# Patient Record
Sex: FEMALE | Race: WHITE | NOT HISPANIC OR LATINO | Employment: UNEMPLOYED | URBAN - METROPOLITAN AREA
[De-identification: names, ages, dates, MRNs, and addresses within clinical notes are randomized per-mention and may not be internally consistent; named-entity substitution may affect disease eponyms.]

---

## 2023-04-04 ENCOUNTER — TELEPHONE (OUTPATIENT)
Dept: PRIMARY CARE | Facility: CLINIC | Age: 53
End: 2023-04-04

## 2023-04-04 NOTE — TELEPHONE ENCOUNTER
Pt called in stating that she had to push her surgery back due to her father getting sick. Her surgery is now set for the end of May and the surgeons office is requiring updated surgical clearance labs since the original ones will not be within a month of surgery. Please place orders and I will notify the pt.

## 2023-04-27 ENCOUNTER — APPOINTMENT (OUTPATIENT)
Dept: PRIMARY CARE | Facility: CLINIC | Age: 53
End: 2023-04-27

## 2023-04-28 ENCOUNTER — LAB (OUTPATIENT)
Dept: LAB | Facility: LAB | Age: 53
End: 2023-04-28
Payer: COMMERCIAL

## 2023-04-28 ENCOUNTER — OFFICE VISIT (OUTPATIENT)
Dept: PRIMARY CARE | Facility: CLINIC | Age: 53
End: 2023-04-28
Payer: COMMERCIAL

## 2023-04-28 VITALS
HEIGHT: 65 IN | SYSTOLIC BLOOD PRESSURE: 114 MMHG | DIASTOLIC BLOOD PRESSURE: 80 MMHG | WEIGHT: 145.8 LBS | HEART RATE: 104 BPM | BODY MASS INDEX: 24.29 KG/M2

## 2023-04-28 DIAGNOSIS — B00.9 HERPES SIMPLEX TYPE 2 INFECTION: ICD-10-CM

## 2023-04-28 DIAGNOSIS — G43.829 MENSTRUAL MIGRAINE WITHOUT STATUS MIGRAINOSUS, NOT INTRACTABLE: ICD-10-CM

## 2023-04-28 DIAGNOSIS — E27.8 ADRENAL GLAND CYST (MULTI): ICD-10-CM

## 2023-04-28 DIAGNOSIS — Z00.00 HEALTHCARE MAINTENANCE: ICD-10-CM

## 2023-04-28 DIAGNOSIS — E78.2 MIXED HYPERLIPIDEMIA: ICD-10-CM

## 2023-04-28 DIAGNOSIS — Z00.00 HEALTHCARE MAINTENANCE: Primary | ICD-10-CM

## 2023-04-28 PROBLEM — M20.12 ACQUIRED HALLUX VALGUS OF LEFT FOOT: Status: ACTIVE | Noted: 2023-04-28

## 2023-04-28 PROBLEM — E78.5 HYPERLIPIDEMIA: Status: ACTIVE | Noted: 2023-04-28

## 2023-04-28 PROBLEM — R76.8 ANA POSITIVE: Status: ACTIVE | Noted: 2023-04-28

## 2023-04-28 PROBLEM — E78.00 ELEVATED LDL CHOLESTEROL LEVEL: Status: ACTIVE | Noted: 2023-04-28

## 2023-04-28 PROBLEM — K21.9 GERD (GASTROESOPHAGEAL REFLUX DISEASE): Status: ACTIVE | Noted: 2023-04-28

## 2023-04-28 LAB
ALANINE AMINOTRANSFERASE (SGPT) (U/L) IN SER/PLAS: 13 U/L (ref 7–45)
ALBUMIN (G/DL) IN SER/PLAS: 4.7 G/DL (ref 3.4–5)
ALKALINE PHOSPHATASE (U/L) IN SER/PLAS: 73 U/L (ref 33–110)
ANION GAP IN SER/PLAS: 11 MMOL/L (ref 10–20)
ASPARTATE AMINOTRANSFERASE (SGOT) (U/L) IN SER/PLAS: 20 U/L (ref 9–39)
BASOPHILS (10*3/UL) IN BLOOD BY AUTOMATED COUNT: 0.06 X10E9/L (ref 0–0.1)
BASOPHILS/100 LEUKOCYTES IN BLOOD BY AUTOMATED COUNT: 1.2 % (ref 0–2)
BILIRUBIN TOTAL (MG/DL) IN SER/PLAS: 0.6 MG/DL (ref 0–1.2)
CALCIUM (MG/DL) IN SER/PLAS: 10.2 MG/DL (ref 8.6–10.3)
CARBON DIOXIDE, TOTAL (MMOL/L) IN SER/PLAS: 30 MMOL/L (ref 21–32)
CHLORIDE (MMOL/L) IN SER/PLAS: 101 MMOL/L (ref 98–107)
CHOLESTEROL (MG/DL) IN SER/PLAS: 225 MG/DL (ref 0–199)
CHOLESTEROL IN HDL (MG/DL) IN SER/PLAS: 68.5 MG/DL
CHOLESTEROL/HDL RATIO: 3.3
CORTISOL (UG/DL) IN SERUM: 20 UG/DL (ref 2.5–20)
CREATININE (MG/DL) IN SER/PLAS: 0.89 MG/DL (ref 0.5–1.05)
EOSINOPHILS (10*3/UL) IN BLOOD BY AUTOMATED COUNT: 0.05 X10E9/L (ref 0–0.7)
EOSINOPHILS/100 LEUKOCYTES IN BLOOD BY AUTOMATED COUNT: 1 % (ref 0–6)
ERYTHROCYTE DISTRIBUTION WIDTH (RATIO) BY AUTOMATED COUNT: 12.5 % (ref 11.5–14.5)
ERYTHROCYTE MEAN CORPUSCULAR HEMOGLOBIN CONCENTRATION (G/DL) BY AUTOMATED: 32.9 G/DL (ref 32–36)
ERYTHROCYTE MEAN CORPUSCULAR VOLUME (FL) BY AUTOMATED COUNT: 94 FL (ref 80–100)
ERYTHROCYTES (10*6/UL) IN BLOOD BY AUTOMATED COUNT: 5.1 X10E12/L (ref 4–5.2)
ESTIMATED AVERAGE GLUCOSE FOR HBA1C: 105 MG/DL
GFR FEMALE: 77 ML/MIN/1.73M2
GLUCOSE (MG/DL) IN SER/PLAS: 88 MG/DL (ref 74–99)
HEMATOCRIT (%) IN BLOOD BY AUTOMATED COUNT: 47.7 % (ref 36–46)
HEMOGLOBIN (G/DL) IN BLOOD: 15.7 G/DL (ref 12–16)
HEMOGLOBIN A1C/HEMOGLOBIN TOTAL IN BLOOD: 5.3 %
IMMATURE GRANULOCYTES/100 LEUKOCYTES IN BLOOD BY AUTOMATED COUNT: 0.2 % (ref 0–0.9)
LDL: 143 MG/DL (ref 0–99)
LEUKOCYTES (10*3/UL) IN BLOOD BY AUTOMATED COUNT: 4.9 X10E9/L (ref 4.4–11.3)
LYMPHOCYTES (10*3/UL) IN BLOOD BY AUTOMATED COUNT: 1.39 X10E9/L (ref 1.2–4.8)
LYMPHOCYTES/100 LEUKOCYTES IN BLOOD BY AUTOMATED COUNT: 28.5 % (ref 13–44)
MONOCYTES (10*3/UL) IN BLOOD BY AUTOMATED COUNT: 0.34 X10E9/L (ref 0.1–1)
MONOCYTES/100 LEUKOCYTES IN BLOOD BY AUTOMATED COUNT: 7 % (ref 2–10)
NEUTROPHILS (10*3/UL) IN BLOOD BY AUTOMATED COUNT: 3.02 X10E9/L (ref 1.2–7.7)
NEUTROPHILS/100 LEUKOCYTES IN BLOOD BY AUTOMATED COUNT: 62.1 % (ref 40–80)
PLATELETS (10*3/UL) IN BLOOD AUTOMATED COUNT: 271 X10E9/L (ref 150–450)
POC APPEARANCE, URINE: CLEAR
POC BILIRUBIN, URINE: NEGATIVE
POC BLOOD, URINE: NEGATIVE
POC COLOR, URINE: ABNORMAL
POC GLUCOSE, URINE: NEGATIVE MG/DL
POC KETONES, URINE: ABNORMAL MG/DL
POC LEUKOCYTES, URINE: NEGATIVE
POC NITRITE,URINE: NEGATIVE
POC PH, URINE: 6 PH
POC PROTEIN, URINE: NEGATIVE MG/DL
POC SPECIFIC GRAVITY, URINE: 1.02
POC UROBILINOGEN, URINE: 0.2 EU/DL
POTASSIUM (MMOL/L) IN SER/PLAS: 4.4 MMOL/L (ref 3.5–5.3)
PROTEIN TOTAL: 7.3 G/DL (ref 6.4–8.2)
SODIUM (MMOL/L) IN SER/PLAS: 138 MMOL/L (ref 136–145)
THYROTROPIN (MIU/L) IN SER/PLAS BY DETECTION LIMIT <= 0.05 MIU/L: 2.31 MIU/L (ref 0.44–3.98)
TRIGLYCERIDE (MG/DL) IN SER/PLAS: 68 MG/DL (ref 0–149)
UREA NITROGEN (MG/DL) IN SER/PLAS: 15 MG/DL (ref 6–23)
VLDL: 14 MG/DL (ref 0–40)

## 2023-04-28 PROCEDURE — 82533 TOTAL CORTISOL: CPT

## 2023-04-28 PROCEDURE — 99396 PREV VISIT EST AGE 40-64: CPT | Performed by: STUDENT IN AN ORGANIZED HEALTH CARE EDUCATION/TRAINING PROGRAM

## 2023-04-28 PROCEDURE — 1036F TOBACCO NON-USER: CPT | Performed by: STUDENT IN AN ORGANIZED HEALTH CARE EDUCATION/TRAINING PROGRAM

## 2023-04-28 PROCEDURE — 80061 LIPID PANEL: CPT

## 2023-04-28 PROCEDURE — 93000 ELECTROCARDIOGRAM COMPLETE: CPT | Performed by: STUDENT IN AN ORGANIZED HEALTH CARE EDUCATION/TRAINING PROGRAM

## 2023-04-28 PROCEDURE — 99213 OFFICE O/P EST LOW 20 MIN: CPT | Performed by: STUDENT IN AN ORGANIZED HEALTH CARE EDUCATION/TRAINING PROGRAM

## 2023-04-28 PROCEDURE — 80053 COMPREHEN METABOLIC PANEL: CPT

## 2023-04-28 PROCEDURE — 85025 COMPLETE CBC W/AUTO DIFF WBC: CPT

## 2023-04-28 PROCEDURE — 83036 HEMOGLOBIN GLYCOSYLATED A1C: CPT

## 2023-04-28 PROCEDURE — 84443 ASSAY THYROID STIM HORMONE: CPT

## 2023-04-28 PROCEDURE — 81002 URINALYSIS NONAUTO W/O SCOPE: CPT | Performed by: STUDENT IN AN ORGANIZED HEALTH CARE EDUCATION/TRAINING PROGRAM

## 2023-04-28 PROCEDURE — 36415 COLL VENOUS BLD VENIPUNCTURE: CPT

## 2023-04-28 RX ORDER — VALACYCLOVIR HYDROCHLORIDE 1 G/1
1000 TABLET, FILM COATED ORAL EVERY 12 HOURS
Qty: 10 TABLET | Refills: 2 | Status: SHIPPED | OUTPATIENT
Start: 2023-04-28 | End: 2023-10-04 | Stop reason: SDUPTHER

## 2023-04-28 RX ORDER — SUMATRIPTAN 50 MG/1
TABLET, FILM COATED ORAL
COMMUNITY
Start: 2016-08-22 | End: 2023-04-28 | Stop reason: SDUPTHER

## 2023-04-28 RX ORDER — SUMATRIPTAN 50 MG/1
TABLET, FILM COATED ORAL
Qty: 9 TABLET | Refills: 3 | Status: SHIPPED | OUTPATIENT
Start: 2023-04-28 | End: 2024-02-14 | Stop reason: SDUPTHER

## 2023-04-28 RX ORDER — VALACYCLOVIR HYDROCHLORIDE 1 G/1
1000 TABLET, FILM COATED ORAL EVERY 12 HOURS
COMMUNITY
End: 2023-04-28 | Stop reason: SDUPTHER

## 2023-04-28 ASSESSMENT — PATIENT HEALTH QUESTIONNAIRE - PHQ9
SUM OF ALL RESPONSES TO PHQ9 QUESTIONS 1 AND 2: 0
2. FEELING DOWN, DEPRESSED OR HOPELESS: NOT AT ALL
1. LITTLE INTEREST OR PLEASURE IN DOING THINGS: NOT AT ALL

## 2023-04-28 NOTE — RESULT ENCOUNTER NOTE
Cholesterol still elevated but did improve to 225, HDL 68, , triglycerides 68    Complete blood cell count well within normal limits    Hemoglobin A1c well within normal limits at 5.3%    Sugar, kidneys, liver, electrolytes are all within normal limits    Thyroid within normal limits    We are still waiting for the cortisol at this time

## 2023-04-28 NOTE — PROGRESS NOTES
Subjective   Patient ID: Yanci Calhoun is a 53 y.o. female who presents for Surgical Clearance.  Today she is accompanied by alone.     HPI  1. Health Maintenance/presurgical clearance  Colonoscopy: performed 9/13/2019, 10 year clearance.  Mammogram: Scheduled for end of April, provided by Gynecologist BRIGETTE.  Pap Smear/HPV; Patient states are up-to-date and normal. Follows with [facility].  Shingles up-to-date 2022 x1 and refuses to get the second due to a reaction at this time;  Tdap 2019  COVID-19 x2  Diet: High in Fats and sugars, stress eating.   Exercise:  3 times a week, Cardio and weight lifting.  Alcohol: No alcohol  Tobacco: Denies    At this time she is scheduled on May 30, 2023 for bilateral upper blepharoplasty and bilateral secondary breast reduction with chest liposuction.   She is currently scheduled to have this performed by Dr. Jonas Taylor through [facility].    2. Adrenal mass/ Unexplained night sweats/Fatigue  Follows with Dr. Hastings for bilateral adrenal adenomas.  Willing and wishing to repeat lab work if medically appropriate/necessary    3. Elevated LDL  Labs performed prior.  Prior CT Cardiac score with result of 0.  Your LDL continues to be elevated above 150  Stated that her father moved in and he eats a diabetic diet and she has been doing better and also last 10+ pounds  Eager to see how her cholesterol is doing at this time    4.  Migraine  Continues to use sumatriptan 50 mg on as-needed basis  Feels well and stable at this time  Requesting refills      Current Outpatient Medications on File Prior to Visit   Medication Sig Dispense Refill    SUMAtriptan (Imitrex) 50 mg tablet Take by mouth.      valACYclovir (Valtrex) 1 gram tablet Take 1 tablet (1,000 mg) by mouth every 12 hours.       No current facility-administered medications on file prior to visit.        No Known Allergies    Immunization History   Administered Date(s) Administered     "Pfizer Purple Cap SARS-CoV-2 05/24/2021, 06/14/2021         Review of Systems  All pertinent positive symptoms are included in the history of present illness.  All other systems have been reviewed and are negative and noncontributory to this patient's current ailments.     Objective   /80 (BP Location: Left arm, Patient Position: Sitting, BP Cuff Size: Adult)   Pulse 104   Ht 1.645 m (5' 4.75\")   Wt 66.1 kg (145 lb 12.8 oz)   BMI 24.45 kg/m²   BSA: 1.74 meters squared      Physical Exam  CONSTITUTIONAL - well nourished, well developed, looks like stated age, in no acute distress, not ill-appearing, and not tired appearing  SKIN - normal skin color and pigmentation, normal skin turgor without rash, lesions, or nodules visualized  HEAD - no trauma, normocephalic  EYES - normal external exam  ENT - TM's intact, no injection, no signs of infection, uvula midline, normal tongue movement and throat normal, no exudate  NECK - supple without rigidity, no neck mass was observed, no thyromegaly or thyroid nodules  CHEST - clear to auscultation, no wheezing, no crackles and no rales, good effort  CARDIAC - regular rate and regular rhythm, no skipped beats, no murmur  ABDOMEN - no organomegaly, soft, nontender, nondistended, normal bowel sounds, no guarding/rebound/rigidity, negative McBurney sign and negative Maki sign  EXTREMITIES - no edema, no deformities  NEUROLOGICAL - normal gait, normal balance, normal motor, no ataxia  PSYCHIATRIC - alert, pleasant and cordial, age-appropriate  IMMUNOLOGIC - no cervical lymphadenopathy     Assessment/Plan   1. Health Maintenance/Risks   Complete physical exam and found within normal limits.  EKG: Shows normal sinus rhythm without any acute changes  We will notify of the results and make recommendations accordingly  Colon cancer screening due 2029  Continue following up with your OB/GYN for your well woman visits    2. Adrenal mass  Lab work will be ordered and we will " notify of the results and make recommendations accordingly  Please continue to follow with Dr. Hastings.    3. Elevated LDL  We discussed the risks and benefits of starting a statin secondary to your elevated LDL.   However, you CT Cardiac score was 0, and Risk calculator was low.   Your risk is low enough we don't need to start a Statin.   If you desire to be more aggressive, I can start you on a low dose statin.  We will notify of the results and make recommendations accordingly    4.  Migraine  We will refill sumatriptan to continue on an as-needed basis    5.  Herpes infection  Refill of valacyclovir to use on an as-needed basis sent to your pharmacy    Please contact the office if you have any further question/concerns

## 2023-04-30 NOTE — RESULT ENCOUNTER NOTE
Cortisol well within normal limits but did increase to the upper end of normal at 20    We will continue monitoring closely but I would recommend that she follows up with the surgeon yearly

## 2023-05-01 ENCOUNTER — TELEPHONE (OUTPATIENT)
Dept: PRIMARY CARE | Facility: CLINIC | Age: 53
End: 2023-05-01
Payer: COMMERCIAL

## 2023-05-01 NOTE — TELEPHONE ENCOUNTER
----- Message from Jerome Keller, DO sent at 4/30/2023  7:57 AM EDT -----  Cortisol well within normal limits but did increase to the upper end of normal at 20    We will continue monitoring closely but I would recommend that she follows up with the surgeon yearly

## 2023-05-01 NOTE — TELEPHONE ENCOUNTER
----- Message from Jerome Keller DO sent at 4/28/2023  3:18 PM EDT -----  Cholesterol still elevated but did improve to 225, HDL 68, , triglycerides 68    Complete blood cell count well within normal limits    Hemoglobin A1c well within normal limits at 5.3%    Sugar, kidneys, liver, electrolytes are all within normal limits    Thyroid within normal limits    We are still waiting for the cortisol at this time

## 2023-10-03 PROBLEM — R61 UNEXPLAINED NIGHT SWEATS: Status: ACTIVE | Noted: 2023-10-03

## 2023-10-03 PROBLEM — L25.9 CONTACT DERMATITIS: Status: ACTIVE | Noted: 2023-10-03

## 2023-10-03 PROBLEM — G43.909 MIGRAINE: Status: ACTIVE | Noted: 2023-10-03

## 2023-10-03 PROBLEM — E27.8 ADRENAL MASS (MULTI): Status: ACTIVE | Noted: 2023-10-03

## 2023-10-03 PROBLEM — R22.0 FACIAL SWELLING: Status: ACTIVE | Noted: 2023-10-03

## 2023-10-03 PROBLEM — R53.83 FATIGUE: Status: ACTIVE | Noted: 2023-10-03

## 2023-10-03 PROBLEM — H91.90 HEARING LOSS: Status: ACTIVE | Noted: 2023-10-03

## 2023-10-03 RX ORDER — SULFAMETHOXAZOLE AND TRIMETHOPRIM 800; 160 MG/1; MG/1
1 TABLET ORAL 2 TIMES DAILY
COMMUNITY
End: 2023-10-05 | Stop reason: ALTCHOICE

## 2023-10-03 RX ORDER — NAPROXEN 500 MG/1
500 TABLET ORAL 2 TIMES DAILY
COMMUNITY
End: 2023-10-05 | Stop reason: ALTCHOICE

## 2023-10-04 ENCOUNTER — OFFICE VISIT (OUTPATIENT)
Dept: PRIMARY CARE | Facility: CLINIC | Age: 53
End: 2023-10-04
Payer: COMMERCIAL

## 2023-10-04 VITALS
SYSTOLIC BLOOD PRESSURE: 116 MMHG | WEIGHT: 132.8 LBS | BODY MASS INDEX: 22.13 KG/M2 | DIASTOLIC BLOOD PRESSURE: 70 MMHG | HEART RATE: 99 BPM | HEIGHT: 65 IN

## 2023-10-04 DIAGNOSIS — E78.2 MIXED HYPERLIPIDEMIA: ICD-10-CM

## 2023-10-04 DIAGNOSIS — G47.9 SLEEP DISTURBANCE: ICD-10-CM

## 2023-10-04 DIAGNOSIS — R61 UNEXPLAINED NIGHT SWEATS: ICD-10-CM

## 2023-10-04 DIAGNOSIS — E27.8 ADRENAL GLAND CYST (MULTI): ICD-10-CM

## 2023-10-04 DIAGNOSIS — G43.909 MIGRAINE WITHOUT STATUS MIGRAINOSUS, NOT INTRACTABLE, UNSPECIFIED MIGRAINE TYPE: ICD-10-CM

## 2023-10-04 DIAGNOSIS — R53.83 OTHER FATIGUE: ICD-10-CM

## 2023-10-04 DIAGNOSIS — E27.8 ADRENAL MASS (MULTI): ICD-10-CM

## 2023-10-04 DIAGNOSIS — F41.9 ANXIETY: Primary | ICD-10-CM

## 2023-10-04 DIAGNOSIS — B00.9 HERPES SIMPLEX TYPE 2 INFECTION: ICD-10-CM

## 2023-10-04 PROCEDURE — 1036F TOBACCO NON-USER: CPT | Performed by: STUDENT IN AN ORGANIZED HEALTH CARE EDUCATION/TRAINING PROGRAM

## 2023-10-04 PROCEDURE — 99214 OFFICE O/P EST MOD 30 MIN: CPT | Performed by: STUDENT IN AN ORGANIZED HEALTH CARE EDUCATION/TRAINING PROGRAM

## 2023-10-04 RX ORDER — VALACYCLOVIR HYDROCHLORIDE 1 G/1
1000 TABLET, FILM COATED ORAL EVERY 12 HOURS
Qty: 10 TABLET | Refills: 2 | Status: SHIPPED | OUTPATIENT
Start: 2023-10-04 | End: 2024-02-14 | Stop reason: SDUPTHER

## 2023-10-04 RX ORDER — NITROFURANTOIN 25; 75 MG/1; MG/1
1 CAPSULE ORAL 2 TIMES DAILY
COMMUNITY
Start: 2023-02-06 | End: 2023-10-05 | Stop reason: ALTCHOICE

## 2023-10-04 RX ORDER — TRAZODONE HYDROCHLORIDE 50 MG/1
50 TABLET ORAL NIGHTLY PRN
Qty: 30 TABLET | Refills: 1 | Status: SHIPPED | OUTPATIENT
Start: 2023-10-04 | End: 2024-10-03

## 2023-10-04 RX ORDER — SULFACETAMIDE SODIUM, SULFUR 100; 50 MG/G; MG/G
EMULSION TOPICAL
COMMUNITY
Start: 2023-08-17

## 2023-10-04 RX ORDER — ESCITALOPRAM OXALATE 5 MG/1
TABLET ORAL
Qty: 30 TABLET | Refills: 1 | Status: SHIPPED | OUTPATIENT
Start: 2023-10-04 | End: 2023-11-22 | Stop reason: SDUPTHER

## 2023-10-04 RX ORDER — ALPRAZOLAM 0.25 MG/1
TABLET ORAL
COMMUNITY
Start: 2023-08-29 | End: 2023-10-05 | Stop reason: ALTCHOICE

## 2023-10-04 NOTE — PROGRESS NOTES
Subjective   Patient ID: Yanci Calhoun is a 53 y.o. female who presents for anxiety and sleep depriving.   today she is accompanied by alone.     HPI  1. Anxiety with sleep depriving  Patient is going through a divorce  Patient admits of increased anxiety and problem with sleeping in the last 4 months.   She states that she sleeps only 1 to 2 hours each night.    She falls asleep around 9 PM, sleep for 2 hours and then wake ups and cannot go back to sleep for the rest of the night.  Patient appears emotional and crying, has lost appetite and has lost 16 pounds since June 2023  She states that her level of concentration has decreased and  forgets things more often.  Denies any decreased pleasure in doing things, enjoys going out with her friends and going to the gym.  Denies any ideation of hurting herself or others.  She admits of trying Ambien which has helped but is reluctant to take that continuously  She also does not want to take any Prozac or other SSRIs due to her making feel numb.      2. Adrenal mass/ Unexplained night sweats/Fatigue  Follows with Dr. Hastings for bilateral adrenal adenomas.  She has an appointment with Dr. Hastings tomorrow     3. Elevated LDL  Labs performed prior.  Prior CT Cardiac score with result of 0.  Your LDL continues to be elevated above 150  Stated that her father moved in and he eats a diabetic diet and she has been doing better and also last 16+ pounds     4.  Migraine  Continues to use sumatriptan 50 mg on as-needed basis  Feels well and stable at this time    5.  Herpes simplex type II infection  Patient takes valacyclovir 1 g twice daily during flare ups  Requesting medication refills    Current Outpatient Medications on File Prior to Visit   Medication Sig Dispense Refill    ALPRAZolam (Xanax) 0.25 mg tablet       nitrofurantoin, macrocrystal-monohydrate, (Macrobid) 100 mg capsule Take 1 capsule (100 mg) by mouth 2 times a day.      semaglutide 0.25 mg or 0.5 mg (2 mg/3 mL)  "pen injector INJECT 10 UNITS SUBCUTANEOUSLY ONCE WEEKLY AS DIRECTED. Discard vial 56 days after opening      sulfacetamide sodium-sulfur (Avar, Plexion) 10-5 % (w/w) topical emulsion WASH AFFECTED AREAS DAILY      naproxen (EC Naprosyn) 500 mg EC tablet Take 1 tablet (500 mg) by mouth 2 times a day. Do not crush, chew, or split.      sulfamethoxazole-trimethoprim (Bactrim DS) 800-160 mg tablet Take 1 tablet by mouth 2 times a day.      SUMAtriptan (Imitrex) 50 mg tablet Take 1 tablet for migraine relief.  May repeat every 2 hours.  Max 200 mg a day 9 tablet 3    valACYclovir (Valtrex) 1 gram tablet Take 1 tablet (1,000 mg) by mouth every 12 hours. 10 tablet 2     No current facility-administered medications on file prior to visit.        No Known Allergies    Immunization History   Administered Date(s) Administered    Hep A / Hep B 03/23/2016, 04/26/2016    Pfizer Purple Cap SARS-CoV-2 05/24/2021, 06/14/2021    Td vaccine, age 7 years and older (TENIVAC) 03/23/2016    Tdap vaccine, age 7 year and older (BOOSTRIX) 01/30/2014, 03/12/2019    Typhoid, Unspecified 03/23/2016    Zoster vaccine, recombinant, adult (SHINGRIX) 08/11/2022         Review of Systems  All pertinent positive symptoms are included in the history of present illness.  All other systems have been reviewed and are negative and noncontributory to this patient's current ailments.     Objective   /70 (BP Location: Left arm, Patient Position: Sitting, BP Cuff Size: Adult)   Pulse 99   Ht 1.645 m (5' 4.75\")   Wt 60.2 kg (132 lb 12.8 oz)   BMI 22.27 kg/m²   BSA: 1.66 meters squared  No visits with results within 1 Month(s) from this visit.   Latest known visit with results is:   Lab on 04/28/2023   Component Date Value Ref Range Status    TSH 04/28/2023 2.31  0.44 - 3.98 mIU/L Final     TSH testing is performed using different testing    methodology at [facility] than at other    [facility]. Direct result comparisons should    " only be made within the same method.    Cholesterol 04/28/2023 225 (H)  0 - 199 mg/dL Final    .      AGE      DESIRABLE   BORDERLINE HIGH   HIGH     0-19 Y     0 - 169       170 - 199     >/= 200    20-24 Y     0 - 189       190 - 224     >/= 225         >24 Y     0 - 199       200 - 239     >/= 240   **All ranges are based on fasting samples. Specific   therapeutic targets will vary based on patient-specific   cardiac risk.  .   Pediatric guidelines reference:Pediatrics 2011, 128(S5).   Adult guidelines reference: NCEP ATPIII Guidelines,     GATITO 2001, 258:2486-97  .   Venipuncture immediately after or during the    administration of Metamizole may lead to falsely   low results. Testing should be performed immediately   prior to Metamizole dosing.    HDL 04/28/2023 68.5  mg/dL Final    .      AGE      VERY LOW   LOW     NORMAL    HIGH       0-19 Y       < 35   < 40     40-45     ----    20-24 Y       ----   < 40       >45     ----      >24 Y       ----   < 40     40-60      >60  .    Cholesterol/HDL Ratio 04/28/2023 3.3   Final    REF VALUES  DESIRABLE  < 3.4  HIGH RISK  > 5.0    LDL 04/28/2023 143 (H)  0 - 99 mg/dL Final    .                           NEAR      BORD      AGE      DESIRABLE  OPTIMAL    HIGH     HIGH     VERY HIGH     0-19 Y     0 - 109     ---    110-129   >/= 130     ----    20-24 Y     0 - 119     ---    120-159   >/= 160     ----      >24 Y     0 -  99   100-129  130-159   160-189     >/=190  .    VLDL 04/28/2023 14  0 - 40 mg/dL Final    Triglycerides 04/28/2023 68  0 - 149 mg/dL Final    .      AGE      DESIRABLE   BORDERLINE HIGH   HIGH     VERY HIGH   0 D-90 D    19 - 174         ----         ----        ----  91 D- 9 Y     0 -  74        75 -  99     >/= 100      ----    10-19 Y     0 -  89        90 - 129     >/= 130      ----    20-24 Y     0 - 114       115 - 149     >/= 150      ----         >24 Y     0 - 149       150 - 199    200- 499    >/= 500  .   Venipuncture immediately after or  during the    administration of Metamizole may lead to falsely   low results. Testing should be performed immediately   prior to Metamizole dosing.    Glucose 04/28/2023 88  74 - 99 mg/dL Final    Sodium 04/28/2023 138  136 - 145 mmol/L Final    Potassium 04/28/2023 4.4  3.5 - 5.3 mmol/L Final    Chloride 04/28/2023 101  98 - 107 mmol/L Final    Bicarbonate 04/28/2023 30  21 - 32 mmol/L Final    Anion Gap 04/28/2023 11  10 - 20 mmol/L Final    Urea Nitrogen 04/28/2023 15  6 - 23 mg/dL Final    Creatinine 04/28/2023 0.89  0.50 - 1.05 mg/dL Final    GFR Female 04/28/2023 77  >90 mL/min/1.73m2 Final     CALCULATIONS OF ESTIMATED GFR ARE PERFORMED   USING THE 2021 CKD-EPI STUDY REFIT EQUATION   WITHOUT THE RACE VARIABLE FOR THE IDMS-TRACEABLE   CREATININE METHODS.    https://jasn.asnjournals.org/content/early/2021/09/22/ASN.3570337427    Calcium 04/28/2023 10.2  8.6 - 10.3 mg/dL Final    Albumin 04/28/2023 4.7  3.4 - 5.0 g/dL Final    Alkaline Phosphatase 04/28/2023 73  33 - 110 U/L Final    Total Protein 04/28/2023 7.3  6.4 - 8.2 g/dL Final    AST 04/28/2023 20  9 - 39 U/L Final    Total Bilirubin 04/28/2023 0.6  0.0 - 1.2 mg/dL Final    ALT (SGPT) 04/28/2023 13  7 - 45 U/L Final     Patients treated with Sulfasalazine may generate    falsely decreased results for ALT.    WBC 04/28/2023 4.9  4.4 - 11.3 x10E9/L Final    RBC 04/28/2023 5.10  4.00 - 5.20 x10E12/L Final    Hemoglobin 04/28/2023 15.7  12.0 - 16.0 g/dL Final    Hematocrit 04/28/2023 47.7 (H)  36.0 - 46.0 % Final    MCV 04/28/2023 94  80 - 100 fL Final    MCHC 04/28/2023 32.9  32.0 - 36.0 g/dL Final    Platelets 04/28/2023 271  150 - 450 x10E9/L Final    RDW 04/28/2023 12.5  11.5 - 14.5 % Final    Neutrophils % 04/28/2023 62.1  40.0 - 80.0 % Final    Immature Granulocytes %, Automated 04/28/2023 0.2  0.0 - 0.9 % Final     Immature Granulocyte Count (IG) includes promyelocytes,    myelocytes and metamyelocytes but does not include bands.   Percent differential  counts (%) should be interpreted in the   context of the absolute cell counts (cells/L).    Lymphocytes % 04/28/2023 28.5  13.0 - 44.0 % Final    Monocytes % 04/28/2023 7.0  2.0 - 10.0 % Final    Eosinophils % 04/28/2023 1.0  0.0 - 6.0 % Final    Basophils % 04/28/2023 1.2  0.0 - 2.0 % Final    Neutrophils Absolute 04/28/2023 3.02  1.20 - 7.70 x10E9/L Final    Lymphocytes Absolute 04/28/2023 1.39  1.20 - 4.80 x10E9/L Final    Monocytes Absolute 04/28/2023 0.34  0.10 - 1.00 x10E9/L Final    Eosinophils Absolute 04/28/2023 0.05  0.00 - 0.70 x10E9/L Final    Basophils Absolute 04/28/2023 0.06  0.00 - 0.10 x10E9/L Final    Hemoglobin A1C 04/28/2023 5.3  % Final         Diagnosis of Diabetes-Adults   Non-Diabetic: < or = 5.6%   Increased risk for developing diabetes: 5.7-6.4%   Diagnostic of diabetes: > or = 6.5%  .       Monitoring of Diabetes                Age (y)     Therapeutic Goal (%)   Adults:          >18           <7.0   Pediatrics:    13-18           <7.5                   7-12           <8.0                   0- 6            7.5-8.5   American Diabetes Association. Diabetes Care 33(S1), Jan 2010.    Estimated Average Glucose 04/28/2023 105  MG/DL Final    Cortisol 04/28/2023 20.0  2.5 - 20.0 ug/dL Final       Physical Exam  CONSTITUTIONAL - well nourished, well developed, looks like stated age, in no acute distress  SKIN - normal skin color and pigmentation, normal skin turgor without rash, lesions, or nodules visualized  HEAD - no trauma, normocephalic  EYES - pupils are equal and reactive to light, extraocular muscles are intact, and normal external exam  ENT - no injection, no signs of infection, uvula midline, normal tongue movement and throat normal, no exudate, nasal passage without discharge and patent  NECK - supple without rigidity, no neck mass was observed, no thyromegaly or thyroid nodules  CHEST - clear to auscultation, no wheezing, no crackles and no rales, good effort  CARDIAC - regular rate  and regular rhythm, no skipped beats, no murmur  ABDOMEN - no organomegaly, soft, nontender, nondistended, normal bowel sounds, no guarding/rebound/rigidity  EXTREMITIES - no edema, no deformities  NEUROLOGICAL - normal gait, normal balance, normal motor, no ataxia, alert, oriented and no focal signs  PSYCHIATRIC - alert, pleasant and cordial, age-appropriate  IMMUNOLOGIC - no cervical lymphadenopathy     Assessment/Plan     1.  Anxiety with sleep disturbance  We are sorry to hear that you are going through with difficulty period, that has influenced your anxiety level and sleep pattern.  After discussing in details pros and cons of antianxiety medications we decided to start on Lexapro 5 mg daily.  Trazodone 50 mg at bedtime was also added to help with your sleeping problem  Prescription of your medication was sent to your pharmacy  We will reevaluate in 4 weeks, with possibility of increasing the dose of Lexapro  Please be aware of medication affects and side effects and call the office for any new or rising problems or concerns    2. Adrenal mass  Please continue to follow with Dr. Hastings.  No further recommendation at this time.     3. Elevated LDL  We discussed the risks and benefits of starting a statin secondary to your elevated LDL.   However, you CT Cardiac score was 0, and Risk calculator was low.   Your risk is low enough we don't need to start a Statin.   Continue to monitor and repeat your lipid panel in 6 months    4.  Migraine  Continue to take sumatriptan to continue on an as-needed basis     5.  Herpes infection  Refill of valacyclovir to use on an as-needed basis sent to your pharmacy    Thank you for letting us be a part of your care team.  Please call the office if you have further questions or concerns regarding your care

## 2023-10-05 ENCOUNTER — TELEPHONE (OUTPATIENT)
Dept: SURGERY | Facility: CLINIC | Age: 53
End: 2023-10-05
Payer: COMMERCIAL

## 2023-10-05 ENCOUNTER — OFFICE VISIT (OUTPATIENT)
Dept: SURGERY | Facility: CLINIC | Age: 53
End: 2023-10-05
Payer: COMMERCIAL

## 2023-10-05 VITALS
HEART RATE: 80 BPM | SYSTOLIC BLOOD PRESSURE: 123 MMHG | WEIGHT: 131 LBS | DIASTOLIC BLOOD PRESSURE: 75 MMHG | BODY MASS INDEX: 21.97 KG/M2

## 2023-10-05 DIAGNOSIS — E27.8 ADRENAL MASS (MULTI): Primary | ICD-10-CM

## 2023-10-05 PROCEDURE — 1036F TOBACCO NON-USER: CPT | Performed by: SURGERY

## 2023-10-05 PROCEDURE — 99214 OFFICE O/P EST MOD 30 MIN: CPT | Performed by: SURGERY

## 2023-10-05 RX ORDER — DEXAMETHASONE 1 MG/1
1 TABLET ORAL ONCE
Qty: 1 TABLET | Refills: 0 | Status: SHIPPED | OUTPATIENT
Start: 2023-10-05 | End: 2024-02-14 | Stop reason: ALTCHOICE

## 2023-10-05 ASSESSMENT — PAIN SCALES - GENERAL: PAINLEVEL: 0-NO PAIN

## 2023-10-05 NOTE — TELEPHONE ENCOUNTER
Call to patient to review email sent regarding instructions for dexamethasone suppression test. Patient states email adequately explained procedure and she has no further questions regarding that. She does have questions regarding insurance coverage. I have encouraged her to start with her own insurance company in the hopes they will be able to review her previous encounter from 2021 where the exact tests were ordered. Patient instructed to call back if I can be of further assistance.    Adequate: hears normal conversation without difficulty

## 2023-10-05 NOTE — H&P
History Of Present Illness  Yanci Figueroa is a 53 y.o. female presenting with bilateral adrenal masses for follow-up.    Mrs. Figueroa has a known history of bilateral adrenal masses.  I first saw her for this issue in 2021.  At that time on CT scan as follow-up from her colon cancer she had bilateral adrenal masses 5.1 cm and the left 2.9 cm on the right.  These were both felt to be bilateral adrenal adenomas based on characteristics.  She had a full hormonal work-up which was negative for any hormonal excess from these masses.    We had a discussion of options including surgery versus observation.  She opted for observation.    In October 2022 she had a follow-up CT scan that showed the masses were stable bilaterally with no significant growth or change.    She returns for ongoing follow-up today.     Past Medical History  She has a past medical history of Personal history of other diseases of the female genital tract.    Surgical History  She has a past surgical history that includes Tonsillectomy (01/18/2016) and Breast surgery (01/18/2016).     Social History  She reports that she has never smoked. She has never used smokeless tobacco. No history on file for alcohol use and drug use.    Family History  Family History   Problem Relation Name Age of Onset    Osteoarthritis Mother      Osteoporosis Mother      Diabetes Father      Heart attack Father      Osteoporosis Other Grandmother         Allergies  Patient has no known allergies.    Results   MRN: [id number]  Patient Name: YANCI FIGUEROA     STUDY:  CT ABDOMEN WO CONTRAST;  10/7/2022 10:55 am     INDICATION:  adrenal mass no contrast per doctor's order.     COMPARISON:  CT abdomen 10/06/2021     ACCESSION NUMBER(S):  [id number]     ORDERING CLINICIAN:  ROSENDA MANZANARES     TECHNIQUE:  CT of the abdomen was performed.  Contiguous axial images were  obtained at 3 mm slice thickness through the abdomen. Coronal and  sagittal reconstructions at 3 mm slice thickness  were performed.     FINDINGS:  LOWER CHEST:  The visualized lung base is unremarkable. The heart is normal in size  without evidence of pericardial effusion. No pleural effusion is  evident.     ABDOMEN:     LIVER:  Stable subcentimeter low-attenuation lesion right hepatic lobe, too  small to characterize likely benign.     BILE DUCTS:  The bile ducts are not dilated.     GALLBLADDER:  The gallbladder is not distended and without calcified stones.     PANCREAS:  The pancreas appears unremarkable.     SPLEEN:  Within normal limits.     ADRENAL GLANDS:  Redemonstration of bilateral adrenal adenomas, the left adrenal  adenomas measure 52 x 21 mm and 30 x 15 mm, on the right the adrenal  adenomas measure 20 x 12 mm and 31 x 22 mm, unchanged since 2021.          Physical Exam  General-well-appearing no acute distress.    Abdomen-soft nontender nondistended no palpable masses.  No CVAT.      Last Recorded Vitals  Blood pressure 123/75, pulse 80, weight 59.4 kg (131 lb).    Relevant Results    See CT report above.  This was personally reviewed with the patient today.    Cortisol-patient had an elevated cortisol level at 20 mg/dL.  Previously she has been running about 9-12.  She said that she has been under a lot of stress lately.  I told her we could do a dexamethasone suppression test to work this up further.       Assessment/Plan       Mrs. Calhoun has a known history of bilateral adrenal adenomas found incidentally on CT scan.  She underwent a full hormonal work-up for this initially in 2021 which was normal.    She has no new complaints in her abdomen and normal physical exam today.  Follow-up CT October 2022 showed nodules were stable.    Plan    1.  Per NIH guidelines she should undergo another 1 year follow-up CT scan for her adrenal masses to see if there is been any change or growth.  We will order this as a CT adrenal protocol with intravenous contrast.    2.  She had a mildly elevated cortisol level.  We will  do some additional work-up on this with a dexamethasone suppression test.    I can follow-up with her once these have been completed.             Stanton Hastings MD

## 2023-10-06 NOTE — PROGRESS NOTES
History Of Present Illness  Yanci Figueroa is a 53 y.o. female presenting with bilateral adrenal masses for follow-up.     Mrs. Figueroa has a known history of bilateral adrenal masses.  I first saw her for this issue in 2021.  At that time on CT scan as follow-up from her colon cancer she had bilateral adrenal masses 5.1 cm and the left 2.9 cm on the right.  These were both felt to be bilateral adrenal adenomas based on characteristics.  She had a full hormonal work-up which was negative for any hormonal excess from these masses.     We had a discussion of options including surgery versus observation.  She opted for observation.     In October 2022 she had a follow-up CT scan that showed the masses were stable bilaterally with no significant growth or change.     She returns for ongoing follow-up today.     Past Medical History  She has a past medical history of Personal history of other diseases of the female genital tract.     Surgical History  She has a past surgical history that includes Tonsillectomy (01/18/2016) and Breast surgery (01/18/2016).     Social History  She reports that she has never smoked. She has never used smokeless tobacco. No history on file for alcohol use and drug use.     Family History  Family History          Family History   Problem Relation Name Age of Onset    Osteoarthritis Mother        Osteoporosis Mother        Diabetes Father        Heart attack Father        Osteoporosis Other Grandmother              Allergies  Patient has no known allergies.     Results   MRN: [id number]  Patient Name: YANCI FIGUEROA     STUDY:  CT ABDOMEN WO CONTRAST;  10/7/2022 10:55 am     INDICATION:  adrenal mass no contrast per doctor's order.     COMPARISON:  CT abdomen 10/06/2021     ACCESSION NUMBER(S):  [id number]     ORDERING CLINICIAN:  ROSENDA MANZANARES     TECHNIQUE:  CT of the abdomen was performed.  Contiguous axial images were  obtained at 3 mm slice thickness through the abdomen. Coronal  and  sagittal reconstructions at 3 mm slice thickness were performed.     FINDINGS:  LOWER CHEST:  The visualized lung base is unremarkable. The heart is normal in size  without evidence of pericardial effusion. No pleural effusion is  evident.     ABDOMEN:     LIVER:  Stable subcentimeter low-attenuation lesion right hepatic lobe, too  small to characterize likely benign.     BILE DUCTS:  The bile ducts are not dilated.     GALLBLADDER:  The gallbladder is not distended and without calcified stones.     PANCREAS:  The pancreas appears unremarkable.     SPLEEN:  Within normal limits.     ADRENAL GLANDS:  Redemonstration of bilateral adrenal adenomas, the left adrenal  adenomas measure 52 x 21 mm and 30 x 15 mm, on the right the adrenal  adenomas measure 20 x 12 mm and 31 x 22 mm, unchanged since 2021.           Physical Exam  General-well-appearing no acute distress.     Abdomen-soft nontender nondistended no palpable masses.  No CVAT.        Last Recorded Vitals  Blood pressure 123/75, pulse 80, weight 59.4 kg (131 lb).     Relevant Results     See CT report above.  This was personally reviewed with the patient today.     Cortisol-patient had an elevated cortisol level at 20 mg/dL.  Previously she has been running about 9-12.  She said that she has been under a lot of stress lately.  I told her we could do a dexamethasone suppression test to work this up further.        Assessment/Plan        Mrs. Calhoun has a known history of bilateral adrenal adenomas found incidentally on CT scan.  She underwent a full hormonal work-up for this initially in 2021 which was normal.     She has no new complaints in her abdomen and normal physical exam today.  Follow-up CT October 2022 showed nodules were stable.     Plan     1.  Per NIH guidelines she should undergo another 1 year follow-up CT scan for her adrenal masses to see if there is been any change or growth.  We will order this as a CT adrenal protocol with intravenous  contrast.     2.  She had a mildly elevated cortisol level.  We will do some additional work-up on this with a dexamethasone suppression test.     I can follow-up with her once these have been completed.

## 2023-10-16 DIAGNOSIS — E27.8 ADRENAL MASS (MULTI): ICD-10-CM

## 2023-10-16 DIAGNOSIS — Z01.818 ENCOUNTER FOR PREADMISSION TESTING: ICD-10-CM

## 2023-10-18 ENCOUNTER — TELEPHONE (OUTPATIENT)
Dept: SURGERY | Facility: CLINIC | Age: 53
End: 2023-10-18
Payer: COMMERCIAL

## 2023-10-18 NOTE — TELEPHONE ENCOUNTER
Call to patient to let her know she will need to go for lab to test creatinine ahead of CT scan. Patient is aware and is scheduled to go on Friday ahead of scan. No further questions.

## 2023-10-19 DIAGNOSIS — E78.2 MIXED HYPERLIPIDEMIA: ICD-10-CM

## 2023-10-20 ENCOUNTER — LAB (OUTPATIENT)
Dept: LAB | Facility: LAB | Age: 53
End: 2023-10-20
Payer: COMMERCIAL

## 2023-10-20 DIAGNOSIS — Z01.818 ENCOUNTER FOR PREADMISSION TESTING: ICD-10-CM

## 2023-10-20 DIAGNOSIS — E27.8 ADRENAL MASS (MULTI): ICD-10-CM

## 2023-10-20 DIAGNOSIS — E78.2 MIXED HYPERLIPIDEMIA: ICD-10-CM

## 2023-10-20 LAB
ALBUMIN SERPL BCP-MCNC: 4.8 G/DL (ref 3.4–5)
ALP SERPL-CCNC: 72 U/L (ref 33–110)
ALT SERPL W P-5'-P-CCNC: 18 U/L (ref 7–45)
ANION GAP SERPL CALC-SCNC: 11 MMOL/L (ref 10–20)
AST SERPL W P-5'-P-CCNC: 20 U/L (ref 9–39)
BILIRUB SERPL-MCNC: 0.7 MG/DL (ref 0–1.2)
BUN SERPL-MCNC: 19 MG/DL (ref 6–23)
CALCIUM SERPL-MCNC: 10.3 MG/DL (ref 8.6–10.3)
CHLORIDE SERPL-SCNC: 103 MMOL/L (ref 98–107)
CHOLEST SERPL-MCNC: 261 MG/DL (ref 0–199)
CHOLESTEROL/HDL RATIO: 3.7
CO2 SERPL-SCNC: 29 MMOL/L (ref 21–32)
CORTIS AM PEAK SERPL-MSCNC: 23.1 UG/DL (ref 5–20)
CREAT SERPL-MCNC: 0.89 MG/DL (ref 0.5–1.05)
GFR SERPL CREATININE-BSD FRML MDRD: 77 ML/MIN/1.73M*2
GLUCOSE SERPL-MCNC: 92 MG/DL (ref 74–99)
HDLC SERPL-MCNC: 70.1 MG/DL
LDLC SERPL CALC-MCNC: 177 MG/DL
NON HDL CHOLESTEROL: 191 MG/DL (ref 0–149)
POTASSIUM SERPL-SCNC: 4.5 MMOL/L (ref 3.5–5.3)
PROT SERPL-MCNC: 7.2 G/DL (ref 6.4–8.2)
SODIUM SERPL-SCNC: 138 MMOL/L (ref 136–145)
TRIGL SERPL-MCNC: 69 MG/DL (ref 0–149)
VLDL: 14 MG/DL (ref 0–40)

## 2023-10-20 PROCEDURE — 80375 DRUG/SUBSTANCE NOS 1-3: CPT

## 2023-10-20 PROCEDURE — 36415 COLL VENOUS BLD VENIPUNCTURE: CPT

## 2023-10-20 PROCEDURE — 80053 COMPREHEN METABOLIC PANEL: CPT

## 2023-10-20 PROCEDURE — 82024 ASSAY OF ACTH: CPT

## 2023-10-20 PROCEDURE — 82533 TOTAL CORTISOL: CPT

## 2023-10-20 PROCEDURE — 80061 LIPID PANEL: CPT

## 2023-10-21 NOTE — RESULT ENCOUNTER NOTE
Cholesterol once again elevated at 261, HDL 70, , triglycerides 69    Sugar, kidneys, liver, electrolytes are all within normal limits

## 2023-10-22 LAB — ACTH PLAS-MCNC: 4 PG/ML (ref 7.2–63.3)

## 2023-10-23 ENCOUNTER — TELEPHONE (OUTPATIENT)
Dept: PRIMARY CARE | Facility: CLINIC | Age: 53
End: 2023-10-23
Payer: COMMERCIAL

## 2023-10-23 NOTE — TELEPHONE ENCOUNTER
----- Message from Jerome Keller DO sent at 10/21/2023  9:25 AM EDT -----  Cholesterol once again elevated at 261, HDL 70, , triglycerides 69    Sugar, kidneys, liver, electrolytes are all within normal limits    Spoke w/pt, pt aware of results

## 2023-10-24 ENCOUNTER — ANCILLARY PROCEDURE (OUTPATIENT)
Dept: RADIOLOGY | Facility: CLINIC | Age: 53
End: 2023-10-24
Payer: COMMERCIAL

## 2023-10-24 DIAGNOSIS — E27.8 ADRENAL MASS (MULTI): ICD-10-CM

## 2023-10-24 PROCEDURE — 2550000001 HC RX 255 CONTRASTS: Performed by: SURGERY

## 2023-10-24 PROCEDURE — 74177 CT ABD & PELVIS W/CONTRAST: CPT

## 2023-10-24 PROCEDURE — 74177 CT ABD & PELVIS W/CONTRAST: CPT | Performed by: STUDENT IN AN ORGANIZED HEALTH CARE EDUCATION/TRAINING PROGRAM

## 2023-10-24 RX ADMIN — IOHEXOL 69 ML: 350 INJECTION, SOLUTION INTRAVENOUS at 09:52

## 2023-10-25 LAB — DEXAMETHASONE SERPL-MCNC: <50 NG/DL

## 2023-10-30 ENCOUNTER — TELEPHONE (OUTPATIENT)
Dept: SURGERY | Facility: CLINIC | Age: 53
End: 2023-10-30
Payer: COMMERCIAL

## 2023-10-30 NOTE — TELEPHONE ENCOUNTER
Call to patient. No answer. Left voicemail message requesting callback to discuss results of dexamethasone suppression test. Asked patient to verify if dexamethasone tab was taken the night before testing as prescribed. Left callback phone #.

## 2023-11-01 ENCOUNTER — TELEPHONE (OUTPATIENT)
Dept: SURGERY | Facility: CLINIC | Age: 53
End: 2023-11-01

## 2023-11-01 ENCOUNTER — APPOINTMENT (OUTPATIENT)
Dept: PRIMARY CARE | Facility: CLINIC | Age: 53
End: 2023-11-01
Payer: COMMERCIAL

## 2023-11-01 NOTE — TELEPHONE ENCOUNTER
Call from patient. She states she did not take Dexamethasone prior to lab tests. Patient notified adrenal masses on most recent CT scan are stable. Patient asking to talk to Dr. Hastings because she is confused about discussed plan of care. Patient assured I would reviewed concerns with Dr. Hastings and call back.

## 2023-11-06 ENCOUNTER — TELEPHONE (OUTPATIENT)
Dept: SURGERY | Facility: CLINIC | Age: 53
End: 2023-11-06
Payer: COMMERCIAL

## 2023-11-06 NOTE — TELEPHONE ENCOUNTER
Call to patient. No answer. Left voicemail message requesting callback to discuss patient's previously discussed concerns for continuing plan of care.

## 2023-11-07 ENCOUNTER — TELEPHONE (OUTPATIENT)
Dept: SURGERY | Facility: CLINIC | Age: 53
End: 2023-11-07
Payer: COMMERCIAL

## 2023-11-07 DIAGNOSIS — E27.8 ADRENAL MASS (MULTI): ICD-10-CM

## 2023-11-07 NOTE — TELEPHONE ENCOUNTER
Return call to patient. Discussed plan of care. Explained that dexamethasone suppression test was ordered to rule out subclinical cushings. Patient has met NIH standards for CT scan surveillance with the completion of her test this year. Adrenal masses stable. Patient would like to move forward with the dexamethasone suppression test. I confirmed that patient still has the active dexamethasone RX at [facility] and they will fill it for her today. Orders for the labs in her EMR. Instructions for completing the test emailed to patient. No further questions at this time.

## 2023-11-22 ENCOUNTER — OFFICE VISIT (OUTPATIENT)
Dept: PRIMARY CARE | Facility: CLINIC | Age: 53
End: 2023-11-22
Payer: COMMERCIAL

## 2023-11-22 VITALS
BODY MASS INDEX: 21.2 KG/M2 | HEART RATE: 95 BPM | DIASTOLIC BLOOD PRESSURE: 70 MMHG | SYSTOLIC BLOOD PRESSURE: 102 MMHG | WEIGHT: 126.4 LBS

## 2023-11-22 DIAGNOSIS — G47.9 SLEEP DISTURBANCE: ICD-10-CM

## 2023-11-22 DIAGNOSIS — F41.9 ANXIETY: Primary | ICD-10-CM

## 2023-11-22 PROCEDURE — 99214 OFFICE O/P EST MOD 30 MIN: CPT | Performed by: STUDENT IN AN ORGANIZED HEALTH CARE EDUCATION/TRAINING PROGRAM

## 2023-11-22 PROCEDURE — 1036F TOBACCO NON-USER: CPT | Performed by: STUDENT IN AN ORGANIZED HEALTH CARE EDUCATION/TRAINING PROGRAM

## 2023-11-22 RX ORDER — ESCITALOPRAM OXALATE 10 MG/1
10 TABLET ORAL DAILY
Qty: 90 TABLET | Refills: 1 | Status: SHIPPED | OUTPATIENT
Start: 2023-11-22 | End: 2024-01-03 | Stop reason: SDUPTHER

## 2023-11-22 NOTE — PROGRESS NOTES
Subjective   Patient ID: Yanci Calhoun is a 53 y.o. female who presents for Anxiety.  Today she is accompanied by alone.     HPI  1.  Anxiety and depression with sleep disturbances  Saw patient early October, where we started her on Lexapro 5 mg daily and trazodone 50 mg at bedtime.  Today, patient is still feeling relative unstable. She does like the Lexapro is helping and feels better than when she was not on any medications. She is reluctant to increase her medications.  She is also worried about any side effects associated with the medication she is taking.  She will cry randomly throughout the day, and feels very stressed.  Her sleep has gotten somewhat better she is able to fall asleep but still wakes up randomly throughout the night even on the trazodone.  Although states that the going back to sleep after waking has become easier since starting the medication. In addition patient is going through a major move, and feels additional stress due to the holidays.  Patient continues to do psychotherapy, but does not know if that is helping.  Although she is willing to stay consistent as she wants to do what ever required to get better.  Patient has had an unintentional weight loss of 30 pounds since March, she has been trying to eat but is having a hard time adjusting.    2. Hyperlipidemia  Patient is worried that her high stress and elevated cortisol levels is affecting her cholesterol as it has increased and been the highest it has ever been.  Patient is reluctant to start any medications until after her stress decreases.  CT cardiac score done in 2021 with a total calcium score of 0  ASCVD risk of 1.2%          Current Outpatient Medications on File Prior to Visit   Medication Sig Dispense Refill    dexAMETHasone (Decadron) 1 mg tablet Take 1 tablet (1 mg) by mouth 1 time for 1 dose. Take at 11:00 pm the night before lab draw for ACTH, cortisol, and DM. Have labs drawn by 9am the following day. 1 tablet 0     escitalopram (Lexapro) 5 mg tablet Take 1/2 tab daily x 7 days then 1 tab daily after 30 tablet 1    semaglutide 0.25 mg or 0.5 mg (2 mg/3 mL) pen injector INJECT 10 UNITS SUBCUTANEOUSLY ONCE WEEKLY AS DIRECTED. Discard vial 56 days after opening      sulfacetamide sodium-sulfur (Avar, Plexion) 10-5 % (w/w) topical emulsion WASH AFFECTED AREAS DAILY      SUMAtriptan (Imitrex) 50 mg tablet Take 1 tablet for migraine relief.  May repeat every 2 hours.  Max 200 mg a day 9 tablet 3    traZODone (Desyrel) 50 mg tablet Take 1 tablet (50 mg) by mouth as needed at bedtime for sleep. 30 tablet 1    valACYclovir (Valtrex) 1 gram tablet Take 1 tablet (1,000 mg) by mouth every 12 hours. 10 tablet 2     No current facility-administered medications on file prior to visit.        No Known Allergies    Immunization History   Administered Date(s) Administered    Hep A / Hep B 03/23/2016, 04/26/2016    Pfizer Purple Cap SARS-CoV-2 05/24/2021, 06/14/2021    Td vaccine, age 7 years and older (TENIVAC) 03/23/2016    Tdap vaccine, age 7 year and older (BOOSTRIX) 01/30/2014, 03/12/2019    Typhoid, Unspecified 03/23/2016    Zoster vaccine, recombinant, adult (SHINGRIX) 08/11/2022         Review of Systems  All pertinent positive symptoms are included in the history of present illness.  All other systems have been reviewed and are negative and noncontributory to this patient's current ailments.     Objective   /70 (BP Location: Left arm, Patient Position: Sitting, BP Cuff Size: Adult)   Pulse 95   Wt 57.3 kg (126 lb 6.4 oz)   LMP  (LMP Unknown)   BMI 21.20 kg/m²   BSA: 1.62 meters squared  No visits with results within 1 Month(s) from this visit.   Latest known visit with results is:   Lab on 10/20/2023   Component Date Value Ref Range Status    Cortisol  A.M. 10/20/2023 23.1 (H)  5.0 - 20.0 ug/dL Final    Dexamethasone 10/20/2023 <50.0  ng/dL Final    INTERPRETIVE INFORMATION: Dexamethasone, Serum or Plasma by                               LC-MS/MS    Adults baseline: Less than 50 ng/dL   8:00 AM draw following 1 mg dexamethasone between 11:00 pm and   12:00 am the previous evenin - 295 ng/dL   8:00 AM draw following 8 mg dexamethasone (4 x 2 mg doses) between   11:00 pm and 12:00 am the previous evenin - 2850 ng/dL  This test was developed and its performance characteristics   determined by [facility] It has not been cleared or   approved by the US Food and Drug Administration. This test was   performed in a CLIA certified laboratory and is intended for   clinical purposes.  Performed By: [facility]  [address] [place] [id number]  : Stanford Núñez MD, PhD  CLIA Number: [id number]    Adrenocorticotropic Hormone (ACTH) 10/20/2023 4.0 (L)  7.2 - 63.3 pg/mL Final    INTERPRETIVE INFORMATION: Adrenocorticotropic Hormone    Reference interval based on samples collected between 7 a.m. and   10 a.m.  No reference intervals established for p.m. collections.    Pediatric reference values are the same as adults (Acta Paediatr   Scand 1981;70:341-345).  This assay measures intact ACTH 1-39;   some types of synthetic ACTH and ACTH fragments are not detected   by this assay.  Performed By: [facility]  [address] [place] [address]  : Stanford Núñez MD, PhD  CLIA Number: [id number]    Cholesterol 10/20/2023 261 (H)  0 - 199 mg/dL Final          Age      Desirable   Borderline High   High     0-19 Y     0 - 169       170 - 199     >/= 200    20-24 Y     0 - 189       190 - 224     >/= 225         >24 Y     0 - 199       200 - 239     >/= 240   **All ranges are based on fasting samples. Specific   therapeutic targets will vary based on patient-specific   cardiac risk.    Pediatric guidelines reference:Pediatrics 2011, 128(S5).Adult guidelines reference: NCEP ATPIII Guidelines,GATITO 2001, 258:2486-97    Venipuncture immediately after or during the  administration of Metamizole may lead to falsely low results. Testing should be performed immediately prior to Metamizole dosing.    HDL-Cholesterol 10/20/2023 70.1  mg/dL Final      Age       Very Low   Low     Normal    High    0-19 Y    < 35      < 40     40-45     ----  20-24 Y    ----     < 40      >45      ----        >24 Y      ----     < 40     40-60      >60      Cholesterol/HDL Ratio 10/20/2023 3.7   Final      Ref Values  Desirable  < 3.4  High Risk  > 5.0    LDL Calculated 10/20/2023 177 (H)  <=99 mg/dL Final                                Near   Borderline      AGE      Desirable  Optimal    High     High     Very High     0-19 Y     0 - 109     ---    110-129   >/= 130     ----    20-24 Y     0 - 119     ---    120-159   >/= 160     ----      >24 Y     0 -  99   100-129  130-159   160-189     >/=190      VLDL 10/20/2023 14  0 - 40 mg/dL Final    Triglycerides 10/20/2023 69  0 - 149 mg/dL Final       Age         Desirable   Borderline High   High     Very High   0 D-90 D    19 - 174         ----         ----        ----  91 D- 9 Y     0 -  74        75 -  99     >/= 100      ----    10-19 Y     0 -  89        90 - 129     >/= 130      ----    20-24 Y     0 - 114       115 - 149     >/= 150      ----         >24 Y     0 - 149       150 - 199    200- 499    >/= 500    Venipuncture immediately after or during the administration of Metamizole may lead to falsely low results. Testing should be performed immediately prior to Metamizole dosing.    Non HDL Cholesterol 10/20/2023 191 (H)  0 - 149 mg/dL Final          Age       Desirable   Borderline High   High     Very High     0-19 Y     0 - 119       120 - 144     >/= 145    >/= 160    20-24 Y     0 - 149       150 - 189     >/= 190      ----         >24 Y    30 mg/dL above LDL Cholesterol goal      Glucose 10/20/2023 92  74 - 99 mg/dL Final    Sodium 10/20/2023 138  136 - 145 mmol/L Final    Potassium 10/20/2023 4.5  3.5 - 5.3 mmol/L Final    Chloride  10/20/2023 103  98 - 107 mmol/L Final    Bicarbonate 10/20/2023 29  21 - 32 mmol/L Final    Anion Gap 10/20/2023 11  10 - 20 mmol/L Final    Urea Nitrogen 10/20/2023 19  6 - 23 mg/dL Final    Creatinine 10/20/2023 0.89  0.50 - 1.05 mg/dL Final    eGFR 10/20/2023 77  >60 mL/min/1.73m*2 Final    Calculations of estimated GFR are performed using the 2021 CKD-EPI Study Refit equation without the race variable for the IDMS-Traceable creatinine methods.  https://jasn.asnjournals.org/content/early/2021/09/22/ASN.7462019761    Calcium 10/20/2023 10.3  8.6 - 10.3 mg/dL Final    Albumin 10/20/2023 4.8  3.4 - 5.0 g/dL Final    Alkaline Phosphatase 10/20/2023 72  33 - 110 U/L Final    Total Protein 10/20/2023 7.2  6.4 - 8.2 g/dL Final    AST 10/20/2023 20  9 - 39 U/L Final    Bilirubin, Total 10/20/2023 0.7  0.0 - 1.2 mg/dL Final    ALT 10/20/2023 18  7 - 45 U/L Final    Patients treated with Sulfasalazine may generate falsely decreased results for ALT.       Physical Exam  CONSTITUTIONAL - well developed, looks like stated age, tearful throughout visit  SKIN - normal skin color and pigmentation, normal skin turgor without rash, lesions, or nodules visualized  HEAD - no trauma, normocephalic  EYES - pupils are equal and reactive to light, and normal external exam  ENT - no injection, no signs of infection, uvula midline, normal tongue movement and throat normal, no exudate, nasal passage without discharge and patent  NECK - supple without rigidity, no neck mass was observed,   LUNG - clear to auscultation, no wheezing, no crackles and no rales, good effort  CARDIAC - regular rate and regular rhythm, no skipped beats, no murmur  ABDOMEN - no organomegaly, soft, nontender, nondistended, normal bowel sounds  EXTREMITIES - no edema, no deformities  NEUROLOGICAL - normal gait, normal balance, normal motor, alert, oriented and no focal signs  PSYCHIATRIC - alert, pleasant and cordial, age-appropriate, tearful and  emotional      Assessment/Plan     1.  Anxiety and depression with sleep disturbances  Discussed side effects with patient at length.  Increase Lexapro to 10 mg daily and continue trazodone 50 mg at bedtime.  Please continue psychotherapy with your therapist  Will follow-up in 3 months, if any if at any time you have thoughts of harming yourself please call 911 or go to the emergency department.      2.  Hyperlipidemia  No medications at this time, will continue to monitor  CT cardiac score done in 2021 with total score of 0  It is important to eat a well-balanced diet, avoiding processed carbohydrates and sugar as well as get regular exercise

## 2024-01-03 DIAGNOSIS — F41.9 ANXIETY: ICD-10-CM

## 2024-01-03 DIAGNOSIS — G47.9 SLEEP DISTURBANCE: ICD-10-CM

## 2024-01-03 RX ORDER — ESCITALOPRAM OXALATE 5 MG/1
10 TABLET ORAL DAILY
Qty: 60 TABLET | Refills: 0 | Status: SHIPPED | OUTPATIENT
Start: 2024-01-03 | End: 2024-01-30 | Stop reason: SDUPTHER

## 2024-01-30 DIAGNOSIS — G47.9 SLEEP DISTURBANCE: ICD-10-CM

## 2024-01-30 DIAGNOSIS — F41.9 ANXIETY: ICD-10-CM

## 2024-01-30 RX ORDER — ESCITALOPRAM OXALATE 5 MG/1
10 TABLET ORAL DAILY
Qty: 180 TABLET | Refills: 0 | Status: SHIPPED | OUTPATIENT
Start: 2024-01-30 | End: 2024-02-14 | Stop reason: ALTCHOICE

## 2024-02-09 ENCOUNTER — TELEPHONE (OUTPATIENT)
Dept: PRIMARY CARE | Facility: CLINIC | Age: 54
End: 2024-02-09
Payer: COMMERCIAL

## 2024-02-09 DIAGNOSIS — Z01.818 PRE-OP EVALUATION: ICD-10-CM

## 2024-02-09 DIAGNOSIS — Z00.00 HEALTHCARE MAINTENANCE: ICD-10-CM

## 2024-02-09 DIAGNOSIS — E78.2 MIXED HYPERLIPIDEMIA: Primary | ICD-10-CM

## 2024-02-09 NOTE — TELEPHONE ENCOUNTER
Pt has a surgery scheduled for 2/22/24, she is leaving to go out of town next Wednesday 2/14/24 evening. The soonest I could get her in was that same day at 2:15PM. Pt wants to have surgical clearance labs including lipid panel placed prior to her appointment so she can have these drawn before she goes out of town. Please place surgical clearance labs.

## 2024-02-12 ENCOUNTER — LAB (OUTPATIENT)
Dept: LAB | Facility: LAB | Age: 54
End: 2024-02-12
Payer: COMMERCIAL

## 2024-02-12 DIAGNOSIS — Z00.00 HEALTHCARE MAINTENANCE: ICD-10-CM

## 2024-02-12 DIAGNOSIS — Z01.818 PRE-OP EVALUATION: ICD-10-CM

## 2024-02-12 DIAGNOSIS — E78.2 MIXED HYPERLIPIDEMIA: ICD-10-CM

## 2024-02-12 LAB
ABO GROUP (TYPE) IN BLOOD: NORMAL
ALBUMIN SERPL BCP-MCNC: 4.3 G/DL (ref 3.4–5)
ALP SERPL-CCNC: 74 U/L (ref 33–110)
ALT SERPL W P-5'-P-CCNC: 19 U/L (ref 7–45)
ANION GAP SERPL CALC-SCNC: 10 MMOL/L (ref 10–20)
ANTIBODY SCREEN: NORMAL
AST SERPL W P-5'-P-CCNC: 25 U/L (ref 9–39)
BASOPHILS # BLD AUTO: 0.05 X10*3/UL (ref 0–0.1)
BASOPHILS NFR BLD AUTO: 0.9 %
BILIRUB SERPL-MCNC: 0.4 MG/DL (ref 0–1.2)
BUN SERPL-MCNC: 20 MG/DL (ref 6–23)
CALCIUM SERPL-MCNC: 9.3 MG/DL (ref 8.6–10.3)
CHLORIDE SERPL-SCNC: 105 MMOL/L (ref 98–107)
CHOLEST SERPL-MCNC: 217 MG/DL (ref 0–199)
CHOLESTEROL/HDL RATIO: 3
CO2 SERPL-SCNC: 29 MMOL/L (ref 21–32)
CREAT SERPL-MCNC: 0.81 MG/DL (ref 0.5–1.05)
EGFRCR SERPLBLD CKD-EPI 2021: 86 ML/MIN/1.73M*2
EOSINOPHIL # BLD AUTO: 0.08 X10*3/UL (ref 0–0.7)
EOSINOPHIL NFR BLD AUTO: 1.4 %
ERYTHROCYTE [DISTWIDTH] IN BLOOD BY AUTOMATED COUNT: 13.2 % (ref 11.5–14.5)
EST. AVERAGE GLUCOSE BLD GHB EST-MCNC: 117 MG/DL
GLUCOSE SERPL-MCNC: 90 MG/DL (ref 74–99)
HBA1C MFR BLD: 5.7 %
HCT VFR BLD AUTO: 46.5 % (ref 36–46)
HDLC SERPL-MCNC: 72.3 MG/DL
HGB BLD-MCNC: 14.9 G/DL (ref 12–16)
IMM GRANULOCYTES # BLD AUTO: 0.02 X10*3/UL (ref 0–0.7)
IMM GRANULOCYTES NFR BLD AUTO: 0.3 % (ref 0–0.9)
LDLC SERPL CALC-MCNC: 132 MG/DL
LYMPHOCYTES # BLD AUTO: 1.94 X10*3/UL (ref 1.2–4.8)
LYMPHOCYTES NFR BLD AUTO: 33.9 %
MCH RBC QN AUTO: 30.8 PG (ref 26–34)
MCHC RBC AUTO-ENTMCNC: 32 G/DL (ref 32–36)
MCV RBC AUTO: 96 FL (ref 80–100)
MONOCYTES # BLD AUTO: 0.4 X10*3/UL (ref 0.1–1)
MONOCYTES NFR BLD AUTO: 7 %
NEUTROPHILS # BLD AUTO: 3.23 X10*3/UL (ref 1.2–7.7)
NEUTROPHILS NFR BLD AUTO: 56.5 %
NON HDL CHOLESTEROL: 145 MG/DL (ref 0–149)
NRBC BLD-RTO: 0 /100 WBCS (ref 0–0)
PLATELET # BLD AUTO: 262 X10*3/UL (ref 150–450)
POTASSIUM SERPL-SCNC: 4.4 MMOL/L (ref 3.5–5.3)
PROT SERPL-MCNC: 7 G/DL (ref 6.4–8.2)
RBC # BLD AUTO: 4.83 X10*6/UL (ref 4–5.2)
RH FACTOR (ANTIGEN D): NORMAL
SODIUM SERPL-SCNC: 140 MMOL/L (ref 136–145)
TRIGL SERPL-MCNC: 65 MG/DL (ref 0–149)
TSH SERPL-ACNC: 1.27 MIU/L (ref 0.44–3.98)
VLDL: 13 MG/DL (ref 0–40)
WBC # BLD AUTO: 5.7 X10*3/UL (ref 4.4–11.3)

## 2024-02-12 PROCEDURE — 80053 COMPREHEN METABOLIC PANEL: CPT

## 2024-02-12 PROCEDURE — 86901 BLOOD TYPING SEROLOGIC RH(D): CPT

## 2024-02-12 PROCEDURE — 80061 LIPID PANEL: CPT

## 2024-02-12 PROCEDURE — 86850 RBC ANTIBODY SCREEN: CPT

## 2024-02-12 PROCEDURE — 85610 PROTHROMBIN TIME: CPT

## 2024-02-12 PROCEDURE — 85730 THROMBOPLASTIN TIME PARTIAL: CPT

## 2024-02-12 PROCEDURE — 83036 HEMOGLOBIN GLYCOSYLATED A1C: CPT

## 2024-02-12 PROCEDURE — 36415 COLL VENOUS BLD VENIPUNCTURE: CPT

## 2024-02-12 PROCEDURE — 85025 COMPLETE CBC W/AUTO DIFF WBC: CPT

## 2024-02-12 PROCEDURE — 84443 ASSAY THYROID STIM HORMONE: CPT

## 2024-02-12 PROCEDURE — 86900 BLOOD TYPING SEROLOGIC ABO: CPT

## 2024-02-13 LAB
APTT PPP: 33 SECONDS (ref 27–38)
INR PPP: 0.9 (ref 0.9–1.1)
PROTHROMBIN TIME: 10.4 SECONDS (ref 9.8–12.8)

## 2024-02-13 NOTE — RESULT ENCOUNTER NOTE
Hemoglobin A1c shows prediabetes at 5.7%    Cholesterol noted much improvement down to 217, HDL 72, , triglycerides 65    Complete blood cell count shows no anemia    Bleeding time within normal limits    Sugar, kidneys, liver, electrolytes are all within normal limits alongside a normal thyroid    I understand the patient does have an appoint with me tomorrow and we will most likely provide all clearance that she needs

## 2024-02-14 ENCOUNTER — OFFICE VISIT (OUTPATIENT)
Dept: PRIMARY CARE | Facility: CLINIC | Age: 54
End: 2024-02-14
Payer: COMMERCIAL

## 2024-02-14 VITALS
SYSTOLIC BLOOD PRESSURE: 110 MMHG | WEIGHT: 130 LBS | HEIGHT: 65 IN | HEART RATE: 98 BPM | DIASTOLIC BLOOD PRESSURE: 68 MMHG | BODY MASS INDEX: 21.66 KG/M2

## 2024-02-14 DIAGNOSIS — E27.8 ADRENAL GLAND CYST (MULTI): ICD-10-CM

## 2024-02-14 DIAGNOSIS — Z00.00 HEALTHCARE MAINTENANCE: Primary | ICD-10-CM

## 2024-02-14 DIAGNOSIS — Z01.818 PRE-OP EVALUATION: ICD-10-CM

## 2024-02-14 DIAGNOSIS — G43.829 MENSTRUAL MIGRAINE WITHOUT STATUS MIGRAINOSUS, NOT INTRACTABLE: ICD-10-CM

## 2024-02-14 DIAGNOSIS — F41.9 ANXIETY: ICD-10-CM

## 2024-02-14 DIAGNOSIS — E27.8 ADRENAL MASS (MULTI): ICD-10-CM

## 2024-02-14 DIAGNOSIS — B00.9 HERPES SIMPLEX TYPE 2 INFECTION: ICD-10-CM

## 2024-02-14 DIAGNOSIS — G47.9 SLEEP DISTURBANCE: ICD-10-CM

## 2024-02-14 PROCEDURE — 93000 ELECTROCARDIOGRAM COMPLETE: CPT | Performed by: STUDENT IN AN ORGANIZED HEALTH CARE EDUCATION/TRAINING PROGRAM

## 2024-02-14 PROCEDURE — 99213 OFFICE O/P EST LOW 20 MIN: CPT | Performed by: STUDENT IN AN ORGANIZED HEALTH CARE EDUCATION/TRAINING PROGRAM

## 2024-02-14 PROCEDURE — 99396 PREV VISIT EST AGE 40-64: CPT | Performed by: STUDENT IN AN ORGANIZED HEALTH CARE EDUCATION/TRAINING PROGRAM

## 2024-02-14 PROCEDURE — 1036F TOBACCO NON-USER: CPT | Performed by: STUDENT IN AN ORGANIZED HEALTH CARE EDUCATION/TRAINING PROGRAM

## 2024-02-14 RX ORDER — SUMATRIPTAN 50 MG/1
TABLET, FILM COATED ORAL
Qty: 9 TABLET | Refills: 3 | Status: SHIPPED | OUTPATIENT
Start: 2024-02-14

## 2024-02-14 RX ORDER — VALACYCLOVIR HYDROCHLORIDE 1 G/1
1000 TABLET, FILM COATED ORAL EVERY 12 HOURS
Qty: 10 TABLET | Refills: 2 | Status: SHIPPED | OUTPATIENT
Start: 2024-02-14

## 2024-02-14 RX ORDER — ACYCLOVIR 400 MG/1
TABLET ORAL
COMMUNITY
Start: 2024-02-12

## 2024-02-14 RX ORDER — ESCITALOPRAM OXALATE 10 MG/1
10 TABLET ORAL DAILY
Qty: 90 TABLET | Refills: 1 | Status: CANCELLED | OUTPATIENT
Start: 2024-02-14

## 2024-02-14 RX ORDER — CEPHALEXIN 500 MG/1
CAPSULE ORAL
COMMUNITY
Start: 2024-02-12

## 2024-02-14 ASSESSMENT — PATIENT HEALTH QUESTIONNAIRE - PHQ9
1. LITTLE INTEREST OR PLEASURE IN DOING THINGS: NOT AT ALL
SUM OF ALL RESPONSES TO PHQ9 QUESTIONS 1 AND 2: 0
2. FEELING DOWN, DEPRESSED OR HOPELESS: NOT AT ALL

## 2024-02-14 NOTE — PROGRESS NOTES
Subjective   Patient ID: Yanci Calhoun is a 54 y.o. female who presents for Surgical Clearance.  Today she is accompanied by alone.     HPI  1. Health Maintenance/presurgical clearance  Patient will be undergoing a bilateral lower blepharoplasty, upper eyelid scar revision with CO2 and breast scar revision on 2/22/2024  Overall health noted below  Colonoscopy: performed 9/13/2019, 10 year clearance.  Mammogram: Last one on 05/16/2023, provided by Gynecologist BRIGETTE.  Pap Smear/HPV; Patient states are up-to-date and normal. Follows with [facility].  Shingles up-to-date 2022 x1 and refuses to get the second due to a reaction at this time;  Tdap 2019  COVID-19 x2  Diet: , stress eating.   Exercise:  2-3 times a week, Cardio and weight lifting.  Alcohol: No alcohol  Tobacco: Denies  Routine blood work completed prior to visit     2. Hyperlipidemia  Labs performed prior to today's visit on 02/12/24  Prior CT Cardiac score with result of 0.   (down from 177)   Cholesterol 217 (down from 261)  HDL 72, triglycerides 65   Believes high numbers were as a result of high stress, which has decreased since last visit     3. Prediabetes  Recent HbA1c 5.7%   Reports having family history of diabetes     4. Adrenal mass/ Unexplained night sweats/Fatigue  Follows with Dr. Hastings for bilateral adrenal adenomas.  Willing and wishing to repeat lab work if medically appropriate/necessary    5.  Migraine  Continues to use sumatriptan 50 mg on as-needed basis  Feels well and stable at this time  Requesting refills    6. Anxiety/Depression/Sleep Disturbances   Initially taking Lexapro 5 mg and ultimately increased to 10 mg  Continues to take trazodone 50 mg at bedtime    States she feels more stable today, is working to wean off Lexapro over time  Does not need any refills as she currently is taking 5 mg every other day      Current Outpatient Medications on File Prior to Visit   Medication Sig Dispense Refill    acyclovir  (Zovirax) 400 mg tablet TAKE 1 TABLET BY MOUTH TWICE DAILY. BEGIN 3 DAYS BEFORE SURGERY AND. CONTINUE UNTIL GONE      cephalexin (Keflex) 500 mg capsule TAKE 1 CAPSULE BY MOUTH THREE TIMES DAILY. BEGIN AFTER PROCEDURE      sulfacetamide sodium-sulfur (Avar, Plexion) 10-5 % (w/w) topical emulsion WASH AFFECTED AREAS DAILY      traZODone (Desyrel) 50 mg tablet Take 1 tablet (50 mg) by mouth as needed at bedtime for sleep. 30 tablet 1    [DISCONTINUED] dexAMETHasone (Decadron) 1 mg tablet Take 1 tablet (1 mg) by mouth 1 time for 1 dose. Take at 11:00 pm the night before lab draw for ACTH, cortisol, and DM. Have labs drawn by 9am the following day. 1 tablet 0    [DISCONTINUED] escitalopram (Lexapro) 5 mg tablet Take 2 tablets (10 mg) by mouth once daily. 180 tablet 0    [DISCONTINUED] semaglutide 0.25 mg or 0.5 mg (2 mg/3 mL) pen injector INJECT 10 UNITS SUBCUTANEOUSLY ONCE WEEKLY AS DIRECTED. Discard vial 56 days after opening      [DISCONTINUED] SUMAtriptan (Imitrex) 50 mg tablet Take 1 tablet for migraine relief.  May repeat every 2 hours.  Max 200 mg a day 9 tablet 3    [DISCONTINUED] valACYclovir (Valtrex) 1 gram tablet Take 1 tablet (1,000 mg) by mouth every 12 hours. 10 tablet 2     No current facility-administered medications on file prior to visit.        No Known Allergies    Immunization History   Administered Date(s) Administered    Hep A / Hep B 03/23/2016, 04/26/2016    Pfizer Purple Cap SARS-CoV-2 05/24/2021, 06/14/2021    Td vaccine, age 7 years and older (TENIVAC) 03/23/2016    Tdap vaccine, age 7 year and older (BOOSTRIX, ADACEL) 01/30/2014, 03/12/2019    Typhoid, Unspecified 03/23/2016    Zoster vaccine, recombinant, adult (SHINGRIX) 08/11/2022         Review of Systems  All pertinent positive symptoms are included in the history of present illness.  All other systems have been reviewed and are negative and noncontributory to this patient's current ailments.     Objective   /68 (BP Location:  "Left arm, Patient Position: Sitting, BP Cuff Size: Adult)   Pulse 98   Ht 1.651 m (5' 5\")   Wt 59 kg (130 lb)   BMI 21.63 kg/m²   BSA: 1.64 meters squared      Physical Exam  CONSTITUTIONAL - well nourished, well developed, looks like stated age, in no acute distress, not ill-appearing, and not tired appearing  SKIN - normal skin color and pigmentation, normal skin turgor without rash, lesions, or nodules visualized  HEAD - no trauma, normocephalic  EYES - normal external exam  ENT - TM's intact, no injection, no signs of infection, uvula midline, normal tongue movement and throat normal, no exudate  NECK - supple without rigidity, no neck mass was observed, no thyromegaly or thyroid nodules  CHEST - clear to auscultation, no wheezing, no crackles and no rales, good effort  CARDIAC - regular rate and regular rhythm, no skipped beats, no murmur  ABDOMEN - no organomegaly, soft, nontender, nondistended  EXTREMITIES - no edema, no deformities  NEUROLOGICAL - normal gait, normal balance, normal motor, no ataxia  PSYCHIATRIC - alert, pleasant and cordial, age-appropriate  IMMUNOLOGIC - no cervical lymphadenopathy     Assessment/Plan   1. Health Maintenance/presurgical clearance  Complete history and physical exam performed  Full surgical clearance was provided  Paperwork sent to the surgeon  EKG: Shows normal sinus rhythm without any acute changes  Colon cancer screening due 2029  Routine lab work results discussed thoroughly, all are lower risk and okay for surgery  No immunizations at today's visit   Continue following up with your OB/GYN for your well woman visits    2. Elevated LDL  CT Cardiac score was 0, and Risk calculator was low, 1.2%   (down from 177)   Cholesterol 217 (down from 261)  HDL 72, triglycerides 65   Risk is low enough we don't need to start a Statin.   If you desire to be more aggressive, I can start you on a low dose statin.     3. Prediabetes  Recent HbA1c 5.7%   Continue to make " changes towards a well-balanced diet and routine exercise   Will continue to monitor      4. Adrenal mass  Lab work will be ordered and we will notify of the results and make recommendations accordingly  Please continue to follow with Dr. Hastings.    5. Anxiety/Depression/Sleep Disturbances   Continue trazodone 50 mg at bedtime   Patient planning to gradually discontinue Lexapro at this time     6.  Migraine  We will refill sumatriptan to continue on an as-needed basis    7.  Herpes infection  Refill of valacyclovir to use on an as-needed basis sent to your pharmacy      Please contact the office if you have any further question/concerns

## 2024-02-16 ENCOUNTER — APPOINTMENT (OUTPATIENT)
Dept: PRIMARY CARE | Facility: CLINIC | Age: 54
End: 2024-02-16
Payer: COMMERCIAL

## 2024-03-22 DIAGNOSIS — F41.9 ANXIETY: ICD-10-CM

## 2024-03-22 RX ORDER — ESCITALOPRAM OXALATE 10 MG/1
10 TABLET ORAL DAILY
Qty: 90 TABLET | Refills: 1 | Status: SHIPPED | OUTPATIENT
Start: 2024-03-22

## 2024-05-06 ENCOUNTER — OFFICE VISIT (OUTPATIENT)
Dept: PRIMARY CARE | Facility: CLINIC | Age: 54
End: 2024-05-06
Payer: COMMERCIAL

## 2024-05-06 VITALS
DIASTOLIC BLOOD PRESSURE: 70 MMHG | SYSTOLIC BLOOD PRESSURE: 116 MMHG | WEIGHT: 134 LBS | HEART RATE: 104 BPM | BODY MASS INDEX: 22.3 KG/M2 | OXYGEN SATURATION: 99 %

## 2024-05-06 DIAGNOSIS — E27.8 ADRENAL GLAND CYST (MULTI): ICD-10-CM

## 2024-05-06 DIAGNOSIS — M25.561 ACUTE PAIN OF RIGHT KNEE: ICD-10-CM

## 2024-05-06 DIAGNOSIS — M19.90 ARTHRITIS: Primary | ICD-10-CM

## 2024-05-06 DIAGNOSIS — S99.922A INJURY OF LEFT FOOT, INITIAL ENCOUNTER: ICD-10-CM

## 2024-05-06 DIAGNOSIS — M79.644 PAIN OF RIGHT THUMB: ICD-10-CM

## 2024-05-06 DIAGNOSIS — M25.521 RIGHT ELBOW PAIN: ICD-10-CM

## 2024-05-06 DIAGNOSIS — M25.50 ARTHRALGIA, UNSPECIFIED JOINT: ICD-10-CM

## 2024-05-06 DIAGNOSIS — R22.42 LOCALIZED SWELLING OF LEFT FOOT: ICD-10-CM

## 2024-05-06 PROCEDURE — 99214 OFFICE O/P EST MOD 30 MIN: CPT | Performed by: STUDENT IN AN ORGANIZED HEALTH CARE EDUCATION/TRAINING PROGRAM

## 2024-05-06 PROCEDURE — 1036F TOBACCO NON-USER: CPT | Performed by: STUDENT IN AN ORGANIZED HEALTH CARE EDUCATION/TRAINING PROGRAM

## 2024-05-06 RX ORDER — DOXYCYCLINE 100 MG/1
100 CAPSULE ORAL 2 TIMES DAILY
Qty: 14 CAPSULE | Refills: 0 | Status: SHIPPED | OUTPATIENT
Start: 2024-05-06 | End: 2024-05-13

## 2024-05-06 ASSESSMENT — PATIENT HEALTH QUESTIONNAIRE - PHQ9
2. FEELING DOWN, DEPRESSED OR HOPELESS: NOT AT ALL
1. LITTLE INTEREST OR PLEASURE IN DOING THINGS: NOT AT ALL
SUM OF ALL RESPONSES TO PHQ9 QUESTIONS 1 AND 2: 0

## 2024-05-06 NOTE — PROGRESS NOTES
Subjective   Patient ID: Yanci Calhoun is a 54 y.o. female who presents for Arthritis and Wound Care.  Today she is accompanied by alone.     HPI    Arthritis   R thumb (MCP), R knee, and R elbow pain x a few months.  Hodan nodes noted on hands bilaterally  Endorses FH of arthritis  Requesting arthritis labs to be drawn.     2. L lateral foot wound  On Thursday evening patient was working and the acrylic chair she was sitting in broke. During the fall she fell and scrapped her foot on the base of the chair.   She has been using bacitracin. It is healing but she wanted to have it looked at to make sure it is not infected.   10 cm well healing scrape noted on Lateral L foot. Well healing with border of erythema surrounding. No purulence noted.   Swelling appreciated distal to lesion. She says it is not painful.     3. Hx of Adrenal gland cyst  Requesting  for cortisol lab to be drawn.     Current Outpatient Medications on File Prior to Visit   Medication Sig Dispense Refill    acyclovir (Zovirax) 400 mg tablet TAKE 1 TABLET BY MOUTH TWICE DAILY. BEGIN 3 DAYS BEFORE SURGERY AND. CONTINUE UNTIL GONE      cephalexin (Keflex) 500 mg capsule TAKE 1 CAPSULE BY MOUTH THREE TIMES DAILY. BEGIN AFTER PROCEDURE      escitalopram (Lexapro) 10 mg tablet Take 1 tablet (10 mg) by mouth once daily. 90 tablet 1    sulfacetamide sodium-sulfur (Avar, Plexion) 10-5 % (w/w) topical emulsion WASH AFFECTED AREAS DAILY      SUMAtriptan (Imitrex) 50 mg tablet Take 1 tablet for migraine relief.  May repeat every 2 hours.  Max 200 mg a day 9 tablet 3    traZODone (Desyrel) 50 mg tablet Take 1 tablet (50 mg) by mouth as needed at bedtime for sleep. 30 tablet 1    valACYclovir (Valtrex) 1 gram tablet Take 1 tablet (1,000 mg) by mouth every 12 hours. 10 tablet 2     No current facility-administered medications on file prior to visit.        No Known Allergies    Immunization History   Administered Date(s) Administered    Hep A / Hep B  03/23/2016, 04/26/2016    Pfizer Purple Cap SARS-CoV-2 05/24/2021, 06/14/2021    Td vaccine, age 7 years and older (TENIVAC) 03/23/2016    Tdap vaccine, age 7 year and older (BOOSTRIX, ADACEL) 01/30/2014, 03/12/2019    Typhoid, Unspecified 03/23/2016    Zoster vaccine, recombinant, adult (SHINGRIX) 08/11/2022       Review of Systems  All pertinent positive symptoms are included in the history of present illness.  All other systems have been reviewed and are negative and noncontributory to this patient's current ailments.     Objective   /70 (BP Location: Left arm, Patient Position: Sitting, BP Cuff Size: Adult)   Pulse 104   Wt 60.8 kg (134 lb)   SpO2 99%   BMI 22.30 kg/m²   BSA: 1.67 meters squared  Growth percentiles: Facility age limit for growth %sada is 20 years. Facility age limit for growth %sada is 20 years.   No visits with results within 1 Month(s) from this visit.   Latest known visit with results is:   Lab on 02/12/2024   Component Date Value Ref Range Status    Glucose 02/12/2024 90  74 - 99 mg/dL Final    Sodium 02/12/2024 140  136 - 145 mmol/L Final    Potassium 02/12/2024 4.4  3.5 - 5.3 mmol/L Final    Chloride 02/12/2024 105  98 - 107 mmol/L Final    Bicarbonate 02/12/2024 29  21 - 32 mmol/L Final    Anion Gap 02/12/2024 10  10 - 20 mmol/L Final    Urea Nitrogen 02/12/2024 20  6 - 23 mg/dL Final    Creatinine 02/12/2024 0.81  0.50 - 1.05 mg/dL Final    eGFR 02/12/2024 86  >60 mL/min/1.73m*2 Final    Calculations of estimated GFR are performed using the 2021 CKD-EPI Study Refit equation without the race variable for the IDMS-Traceable creatinine methods.  https://jasn.asnjournals.org/content/early/2021/09/22/ASN.6400714676    Calcium 02/12/2024 9.3  8.6 - 10.3 mg/dL Final    Albumin 02/12/2024 4.3  3.4 - 5.0 g/dL Final    Alkaline Phosphatase 02/12/2024 74  33 - 110 U/L Final    Total Protein 02/12/2024 7.0  6.4 - 8.2 g/dL Final    AST 02/12/2024 25  9 - 39 U/L Final    Bilirubin, Total  02/12/2024 0.4  0.0 - 1.2 mg/dL Final    ALT 02/12/2024 19  7 - 45 U/L Final    Patients treated with Sulfasalazine may generate falsely decreased results for ALT.    Protime 02/12/2024 10.4  9.8 - 12.8 seconds Final    INR 02/12/2024 0.9  0.9 - 1.1 Final    aPTT 02/12/2024 33  27 - 38 seconds Final    ABO TYPE 02/12/2024 O   Final    Rh TYPE 02/12/2024 POS   Final    ANTIBODY SCREEN 02/12/2024 NEG   Final    Thyroid Stimulating Hormone 02/12/2024 1.27  0.44 - 3.98 mIU/L Final    Cholesterol 02/12/2024 217 (H)  0 - 199 mg/dL Final          Age      Desirable   Borderline High   High     0-19 Y     0 - 169       170 - 199     >/= 200    20-24 Y     0 - 189       190 - 224     >/= 225         >24 Y     0 - 199       200 - 239     >/= 240   **All ranges are based on fasting samples. Specific   therapeutic targets will vary based on patient-specific   cardiac risk.    Pediatric guidelines reference:Pediatrics 2011, 128(S5).Adult guidelines reference: NCEP ATPIII Guidelines,GATITO 2001, 258:2486-97    Venipuncture immediately after or during the administration of Metamizole may lead to falsely low results. Testing should be performed immediately prior to Metamizole dosing.    HDL-Cholesterol 02/12/2024 72.3  mg/dL Final      Age       Very Low   Low     Normal    High    0-19 Y    < 35      < 40     40-45     ----  20-24 Y    ----     < 40      >45      ----        >24 Y      ----     < 40     40-60      >60      Cholesterol/HDL Ratio 02/12/2024 3.0   Final      Ref Values  Desirable  < 3.4  High Risk  > 5.0    LDL Calculated 02/12/2024 132 (H)  <=99 mg/dL Final                                Near   Borderline      AGE      Desirable  Optimal    High     High     Very High     0-19 Y     0 - 109     ---    110-129   >/= 130     ----    20-24 Y     0 - 119     ---    120-159   >/= 160     ----      >24 Y     0 -  99   100-129  130-159   160-189     >/=190      VLDL 02/12/2024 13  0 - 40 mg/dL Final    Triglycerides  02/12/2024 65  0 - 149 mg/dL Final       Age         Desirable   Borderline High   High     Very High   0 D-90 D    19 - 174         ----         ----        ----  91 D- 9 Y     0 -  74        75 -  99     >/= 100      ----    10-19 Y     0 -  89        90 - 129     >/= 130      ----    20-24 Y     0 - 114       115 - 149     >/= 150      ----         >24 Y     0 - 149       150 - 199    200- 499    >/= 500    Venipuncture immediately after or during the administration of Metamizole may lead to falsely low results. Testing should be performed immediately prior to Metamizole dosing.    Non HDL Cholesterol 02/12/2024 145  0 - 149 mg/dL Final          Age       Desirable   Borderline High   High     Very High     0-19 Y     0 - 119       120 - 144     >/= 145    >/= 160    20-24 Y     0 - 149       150 - 189     >/= 190      ----         >24 Y    30 mg/dL above LDL Cholesterol goal      WBC 02/12/2024 5.7  4.4 - 11.3 x10*3/uL Final    nRBC 02/12/2024 0.0  0.0 - 0.0 /100 WBCs Final    RBC 02/12/2024 4.83  4.00 - 5.20 x10*6/uL Final    Hemoglobin 02/12/2024 14.9  12.0 - 16.0 g/dL Final    Hematocrit 02/12/2024 46.5 (H)  36.0 - 46.0 % Final    MCV 02/12/2024 96  80 - 100 fL Final    MCH 02/12/2024 30.8  26.0 - 34.0 pg Final    MCHC 02/12/2024 32.0  32.0 - 36.0 g/dL Final    RDW 02/12/2024 13.2  11.5 - 14.5 % Final    Platelets 02/12/2024 262  150 - 450 x10*3/uL Final    Neutrophils % 02/12/2024 56.5  40.0 - 80.0 % Final    Immature Granulocytes %, Automated 02/12/2024 0.3  0.0 - 0.9 % Final    Immature Granulocyte Count (IG) includes promyelocytes, myelocytes and metamyelocytes but does not include bands. Percent differential counts (%) should be interpreted in the context of the absolute cell counts (cells/UL).    Lymphocytes % 02/12/2024 33.9  13.0 - 44.0 % Final    Monocytes % 02/12/2024 7.0  2.0 - 10.0 % Final    Eosinophils % 02/12/2024 1.4  0.0 - 6.0 % Final    Basophils % 02/12/2024 0.9  0.0 - 2.0 % Final     Neutrophils Absolute 02/12/2024 3.23  1.20 - 7.70 x10*3/uL Final    Percent differential counts (%) should be interpreted in the context of the absolute cell counts (cells/uL).    Immature Granulocytes Absolute, Au* 02/12/2024 0.02  0.00 - 0.70 x10*3/uL Final    Lymphocytes Absolute 02/12/2024 1.94  1.20 - 4.80 x10*3/uL Final    Monocytes Absolute 02/12/2024 0.40  0.10 - 1.00 x10*3/uL Final    Eosinophils Absolute 02/12/2024 0.08  0.00 - 0.70 x10*3/uL Final    Basophils Absolute 02/12/2024 0.05  0.00 - 0.10 x10*3/uL Final    Hemoglobin A1C 02/12/2024 5.7 (H)  see below % Final    Estimated Average Glucose 02/12/2024 117  Not Established mg/dL Final       Physical Exam    CONSTITUTIONAL - well nourished, well developed, looks like stated age, in no acute distress, not ill-appearing, and not tired appearing  SKIN - normal skin color and pigmentation, normal skin turgor without rash, lesions, or nodules visualized  HEAD - no trauma, normocephalic  EYES - normal external exam  CHEST -no distressed breathing, good effort  EXTREMITIES - 10cm scrape on L lateral foot, well healing, edema noted distal to lesion. Non-tender. No heat. Erythematous around the border of lesion.   Hodan nodes noted on hand bilaterally  NEUROLOGICAL - normal balance, normal motor, no ataxia  PSYCHIATRIC - alert, pleasant and cordial, age-appropriate      Assessment/Plan       Arthritis  We ordered an arthritis panel to dig into the etiology of your joint pain.   We have also order Xray of the hands bilaterally.   We will follow up with results as they become available.     2. L foot wound  We ordered an X ray of affected foot.   We also sent a 7 day course of doxy to your pharmacy.   We encourage you to hold off on the bacitracin and use vaseline moving forward.     3. Hx of Adrenal gland cyst  Cortisol level was added to your blood work.     We will follow up with results as the become available.     Thank you for letting us be a part of your  care team.  Please call the office if you have further questions or concerns regarding your care

## 2024-05-07 ENCOUNTER — HOSPITAL ENCOUNTER (OUTPATIENT)
Dept: RADIOLOGY | Facility: CLINIC | Age: 54
Discharge: HOME | End: 2024-05-07
Payer: COMMERCIAL

## 2024-05-07 ENCOUNTER — LAB (OUTPATIENT)
Dept: LAB | Facility: LAB | Age: 54
End: 2024-05-07
Payer: COMMERCIAL

## 2024-05-07 DIAGNOSIS — M25.521 RIGHT ELBOW PAIN: ICD-10-CM

## 2024-05-07 DIAGNOSIS — M25.561 ACUTE PAIN OF RIGHT KNEE: ICD-10-CM

## 2024-05-07 DIAGNOSIS — M19.90 ARTHRITIS: ICD-10-CM

## 2024-05-07 DIAGNOSIS — E27.8 ADRENAL MASS (MULTI): ICD-10-CM

## 2024-05-07 DIAGNOSIS — E27.8 ADRENAL GLAND CYST (MULTI): ICD-10-CM

## 2024-05-07 DIAGNOSIS — R22.42 LOCALIZED SWELLING OF LEFT FOOT: ICD-10-CM

## 2024-05-07 DIAGNOSIS — M79.644 PAIN OF RIGHT THUMB: ICD-10-CM

## 2024-05-07 DIAGNOSIS — S99.922A INJURY OF LEFT FOOT, INITIAL ENCOUNTER: ICD-10-CM

## 2024-05-07 DIAGNOSIS — M25.50 ARTHRALGIA, UNSPECIFIED JOINT: ICD-10-CM

## 2024-05-07 LAB
CENTROMERE B AB SER-ACNC: <0.2 AI
CHROMATIN AB SERPL-ACNC: <0.2 AI
CORTIS SERPL-MCNC: 16.9 UG/DL (ref 2.5–20)
DSDNA AB SER-ACNC: <1 IU/ML
ENA JO1 AB SER QL IA: <0.2 AI
ENA RNP AB SER IA-ACNC: <0.2 AI
ENA SCL70 AB SER QL IA: <0.2 AI
ENA SM AB SER IA-ACNC: <0.2 AI
ENA SM+RNP AB SER QL IA: <0.2 AI
ENA SS-A AB SER IA-ACNC: <0.2 AI
ENA SS-B AB SER IA-ACNC: <0.2 AI
ERYTHROCYTE [SEDIMENTATION RATE] IN BLOOD BY WESTERGREN METHOD: 18 MM/H (ref 0–30)
RHEUMATOID FACT SER NEPH-ACNC: 16 IU/ML (ref 0–15)
RIBOSOMAL P AB SER-ACNC: <0.2 AI
URATE SERPL-MCNC: 4.3 MG/DL (ref 2.3–6.7)

## 2024-05-07 PROCEDURE — 86225 DNA ANTIBODY NATIVE: CPT

## 2024-05-07 PROCEDURE — 85652 RBC SED RATE AUTOMATED: CPT

## 2024-05-07 PROCEDURE — 80375 DRUG/SUBSTANCE NOS 1-3: CPT

## 2024-05-07 PROCEDURE — 82533 TOTAL CORTISOL: CPT

## 2024-05-07 PROCEDURE — 86431 RHEUMATOID FACTOR QUANT: CPT

## 2024-05-07 PROCEDURE — 82024 ASSAY OF ACTH: CPT

## 2024-05-07 PROCEDURE — 73630 X-RAY EXAM OF FOOT: CPT | Mod: LEFT SIDE | Performed by: STUDENT IN AN ORGANIZED HEALTH CARE EDUCATION/TRAINING PROGRAM

## 2024-05-07 PROCEDURE — 73130 X-RAY EXAM OF HAND: CPT | Mod: BILATERAL PROCEDURE | Performed by: STUDENT IN AN ORGANIZED HEALTH CARE EDUCATION/TRAINING PROGRAM

## 2024-05-07 PROCEDURE — 84550 ASSAY OF BLOOD/URIC ACID: CPT

## 2024-05-07 PROCEDURE — 86038 ANTINUCLEAR ANTIBODIES: CPT

## 2024-05-07 PROCEDURE — 86235 NUCLEAR ANTIGEN ANTIBODY: CPT

## 2024-05-07 PROCEDURE — 73130 X-RAY EXAM OF HAND: CPT | Mod: 50

## 2024-05-07 PROCEDURE — 36415 COLL VENOUS BLD VENIPUNCTURE: CPT

## 2024-05-07 PROCEDURE — 73630 X-RAY EXAM OF FOOT: CPT | Mod: LT

## 2024-05-07 RX ORDER — SULFAMETHOXAZOLE AND TRIMETHOPRIM 800; 160 MG/1; MG/1
1 TABLET ORAL 2 TIMES DAILY
Qty: 14 TABLET | Refills: 0 | Status: SHIPPED | OUTPATIENT
Start: 2024-05-07 | End: 2024-05-14

## 2024-05-08 DIAGNOSIS — M05.80 POLYARTHRITIS WITH POSITIVE RHEUMATOID FACTOR (MULTI): Primary | ICD-10-CM

## 2024-05-08 NOTE — RESULT ENCOUNTER NOTE
Rheumatoid factor slightly elevated at 16 with normal being 15 and below    This could be a nonspecific finding but it may be wise to discuss this further with a rheumatologist  I will place a requisition within the chart    Cortisol within normal limits    ANABEL panel completely normal alongside a normal uric acid and a normal sedimentation rate    I feel that the rheumatoid factor may be a slight false positive but if the patient would like, I will easily place the referral for rheumatology to discuss this further

## 2024-05-09 LAB — ACTH PLAS-MCNC: 1.9 PG/ML (ref 7.2–63.3)

## 2024-05-10 LAB
ANA SER QL HEP2 SUBST: NEGATIVE
ANA SER QL HEP2 SUBST: NEGATIVE

## 2024-05-11 LAB — DEXAMETHASONE SERPL-MCNC: <50 NG/DL

## 2024-05-12 NOTE — RESULT ENCOUNTER NOTE
X-ray of bilateral hands are within normal limits and no pronounced degenerative changes    These are unremarkable/normal

## 2024-05-29 ENCOUNTER — TELEPHONE (OUTPATIENT)
Dept: SURGERY | Facility: CLINIC | Age: 54
End: 2024-05-29
Payer: COMMERCIAL

## 2024-05-29 DIAGNOSIS — E27.8 ADRENAL GLAND CYST (MULTI): ICD-10-CM

## 2024-05-29 RX ORDER — DEXAMETHASONE 1 MG/1
1 TABLET ORAL ONCE
Qty: 1 TABLET | Refills: 0 | Status: SHIPPED | OUTPATIENT
Start: 2024-05-29 | End: 2024-05-29

## 2024-05-29 NOTE — TELEPHONE ENCOUNTER
Return call to patient. No answer. Left voicemail message notifying patient that her questions discussed last week were reviewed with Dr. Hastings. He does still advise her to complete dexamethasone suppression test. Instructions for test emailed to address on file. Callback requested and number provided.

## 2024-06-03 ENCOUNTER — TELEPHONE (OUTPATIENT)
Dept: SURGICAL ONCOLOGY | Facility: HOSPITAL | Age: 54
End: 2024-06-03
Payer: COMMERCIAL

## 2024-06-28 ENCOUNTER — APPOINTMENT (OUTPATIENT)
Dept: RHEUMATOLOGY | Facility: CLINIC | Age: 54
End: 2024-06-28
Payer: COMMERCIAL

## 2024-06-28 VITALS — BODY MASS INDEX: 22.3 KG/M2 | WEIGHT: 134 LBS | DIASTOLIC BLOOD PRESSURE: 73 MMHG | SYSTOLIC BLOOD PRESSURE: 118 MMHG

## 2024-06-28 DIAGNOSIS — M05.80 POLYARTHRITIS WITH POSITIVE RHEUMATOID FACTOR (MULTI): Primary | ICD-10-CM

## 2024-06-28 PROCEDURE — 99204 OFFICE O/P NEW MOD 45 MIN: CPT | Performed by: INTERNAL MEDICINE

## 2024-06-28 PROCEDURE — 1036F TOBACCO NON-USER: CPT | Performed by: INTERNAL MEDICINE

## 2024-06-28 NOTE — PROGRESS NOTES
Subjective  . Yanci Calhoun is a 54 y.o. female who presents for New Patient Visit (Pain in joints).    HPI.  54-year-old female with history of anxiety, GERD, dyslipidemia and migraines referred by primary care physician Dr. Jerome Keller DO in consultation for positive rheumatoid factor and joint pain evaluation.    Patient reports intermittent pain and swelling of the right CMC joint for the past 1 year.  She rates pain up to 5/10 at times.  She has no history of trauma to the thumb.  She has not noticed numbness, tingling or discoloration of the thumb.  She also reports right knee soreness that comes and goes for the past several months.  She has not noticed redness, swelling or warmth of the right knee.  She has no history of trauma to the right knee.    Mother has PMR.    Social history: She does not smoke.  She drinks 1 alcoholic beverages over the weekend.  Currently she is not working.  Surgical history: Breast reduction surgery and tonsillectomy.    Review of Systems   All other systems reviewed and are negative.    Objective     Blood pressure 118/73, weight 60.8 kg (134 lb).    Physical Exam.  Gen. AAO x3, NAD.  HEENT: No pallor or icterus, PERRLA, EOMI. Oropharynx is clear. MM moist,Parotid glands  not enlarged. No cervical lymphadenopathy .  Skin: No rashes.  Heart: S1, S2/ RRR. No murmurs or gallops.  Lungs: CTA B.  Abdomen: Soft, NT/ND, BS regular.  MSK: No.swelling or tenderness of the  upper or lower extremity joints with full ROM, Neck,spine and Moises SI with out tenderness.  Neuro: CN II-XII intact. Sensation to touch intact.Strength 5/5 throughout. DTR 2+ and symmetrical.  Psych:Appropriate mood and behavior  EXT: No edema    Assessment/Plan . 54-year-old female with history of anxiety, GERD, dyslipidemia and migraines presented with intermittent pain of the right CMC and knee joint.  She notes intermittent swelling of the right CMC joint.  Physical examination is benign.  Blood work  obtained last month showed positive RF at 16.  ANABEL with reflex MARNIE negative.  Uric acid and sed rate within normal limits.  Next of the hand was unremarkable.    Discussed with patient that she he has a weak positive rheumatoid factor that could be of no clinical significance.  However will obtain anti-CCP and completion of the workup.    We will communicate with the patient.     This note was partially generated using the Dragon Voice recognition system. There may be some incorrect wording, spelling and/or spelling errors or punctuation errors that were not corrected prior to committing the note to the medical record.      Problem List Items Addressed This Visit       Polyarthritis with positive rheumatoid factor (Multi) - Primary    Relevant Orders    Citrulline Antibody, IgG    C-Reactive Protein            Active Ambulatory Problems     Diagnosis Date Noted    Acquired hallux valgus of left foot 04/28/2023    Adrenal gland cyst (Multi) 04/28/2023    ANABEL positive 04/28/2023    Elevated LDL cholesterol level 04/28/2023    GERD (gastroesophageal reflux disease) 04/28/2023    Herpes simplex type 2 infection 04/28/2023    Hyperlipidemia 04/28/2023    Menstrual migraine without status migrainosus, not intractable 04/28/2023    Adrenal mass (Multi) 10/03/2023    Contact dermatitis 10/03/2023    Facial swelling 10/03/2023    Fatigue 10/03/2023    Hearing loss 10/03/2023    Migraine 10/03/2023    Unexplained night sweats 10/03/2023    Anxiety 10/04/2023    Sleep disturbance 10/04/2023    Polyarthritis with positive rheumatoid factor (Multi) 05/08/2024     Resolved Ambulatory Problems     Diagnosis Date Noted    No Resolved Ambulatory Problems     Past Medical History:   Diagnosis Date    Adrenal adenoma     Personal history of other diseases of the female genital tract        Family History   Problem Relation Name Age of Onset    Osteoarthritis Mother      Osteoporosis Mother      Diabetes Father      Heart attack Father       Osteoporosis Other Grandmother        Past Surgical History:   Procedure Laterality Date    BREAST SURGERY  01/18/2016    Breast Surgery Reduction Procedure    TONSILLECTOMY  01/18/2016    Tonsillectomy       Social History     Tobacco Use   Smoking Status Never   Smokeless Tobacco Never       Allergies  Patient has no known allergies.    Current Meds  Current Outpatient Medications   Medication Instructions    acyclovir (Zovirax) 400 mg tablet TAKE 1 TABLET BY MOUTH TWICE DAILY. BEGIN 3 DAYS BEFORE SURGERY AND. CONTINUE UNTIL GONE    cephalexin (Keflex) 500 mg capsule TAKE 1 CAPSULE BY MOUTH THREE TIMES DAILY. BEGIN AFTER PROCEDURE    dexAMETHasone (DECADRON) 1 mg, oral, Once, For dexamethasone tolerance test. Take at 11:00pm the night before lab draw. Be sure to have cortisol, DM, and ACTH labs drawn before 9:00am the following day.    escitalopram (LEXAPRO) 10 mg, oral, Daily    sulfacetamide sodium-sulfur (Avar, Plexion) 10-5 % (w/w) topical emulsion WASH AFFECTED AREAS DAILY    SUMAtriptan (Imitrex) 50 mg tablet Take 1 tablet for migraine relief.  May repeat every 2 hours.  Max 200 mg a day    traZODone (DESYREL) 50 mg, oral, Nightly PRN    valACYclovir (VALTREX) 1,000 mg, oral, Every 12 hours        Lab Results   Component Value Date    RF 16 (H) 05/07/2024    SEDRATE 18 05/07/2024    URICACID 4.3 05/07/2024    DNADS <1.0 05/07/2024        Rad Moore MD

## 2024-06-28 NOTE — LETTER
June 28, 2024     Jerome Keller DO  [address]  [facility][note 3], [address]  [facility] [address]    Patient: Yanci Calhoun   YOB: 1970   Date of Visit: 6/28/2024       Dear Dr. Jerome Keller DO:    Thank you for referring Yanci Calhoun to me for evaluation. Below are my notes for this consultation.  If you have questions, please do not hesitate to call me. I look forward to following your patient along with you.       Sincerely,     Rad Moore MD      CC: No Recipients  ______________________________________________________________________________________    Subjective . Yanci Calhoun is a 54 y.o. female who presents for New Patient Visit (Pain in joints).    HPI.  54-year-old female with history of anxiety, GERD, dyslipidemia and migraines referred by primary care physician Dr. Jerome Keller DO in consultation for positive rheumatoid factor and joint pain evaluation.    Patient reports intermittent pain and swelling of the right CMC joint for the past 1 year.  She rates pain up to 5/10 at times.  She has no history of trauma to the thumb.  She has not noticed numbness, tingling or discoloration of the thumb.  She also reports right knee soreness that comes and goes for the past several months.  She has not noticed redness, swelling or warmth of the right knee.  She has no history of trauma to the right knee.    Mother has PMR.    Social history: She does not smoke.  She drinks 1 alcoholic beverages over the weekend.  Currently she is not working.  Surgical history: Breast reduction surgery and tonsillectomy.    Review of Systems   All other systems reviewed and are negative.    Objective    Blood pressure 118/73, weight 60.8 kg (134 lb).    Physical Exam.  Gen. AAO x3, NAD.  HEENT: No pallor or icterus, PERRLA, EOMI. Oropharynx is clear. MM moist,Parotid glands  not enlarged. No cervical lymphadenopathy .  Skin: No rashes.  Heart: S1, S2/ RRR. No murmurs or  gallops.  Lungs: CTA B.  Abdomen: Soft, NT/ND, BS regular.  MSK: No.swelling or tenderness of the  upper or lower extremity joints with full ROM, Neck,spine and Moises SI with out tenderness.  Neuro: CN II-XII intact. Sensation to touch intact.Strength 5/5 throughout. DTR 2+ and symmetrical.  Psych:Appropriate mood and behavior  EXT: No edema    Assessment/Plan. 54-year-old female with history of anxiety, GERD, dyslipidemia and migraines presented with intermittent pain of the right CMC and knee joint.  She notes intermittent swelling of the right CMC joint.  Physical examination is benign.  Blood work obtained last month showed positive RF at 16.  ANABEL with reflex MARNIE negative.  Uric acid and sed rate within normal limits.  Next of the hand was unremarkable.    Discussed with patient that she he has a weak positive rheumatoid factor that could be of no clinical significance.  However will obtain anti-CCP and completion of the workup.    We will communicate with the patient.     This note was partially generated using the Dragon Voice recognition system. There may be some incorrect wording, spelling and/or spelling errors or punctuation errors that were not corrected prior to committing the note to the medical record.      Problem List Items Addressed This Visit       Polyarthritis with positive rheumatoid factor (Multi) - Primary    Relevant Orders    Citrulline Antibody, IgG    C-Reactive Protein            Active Ambulatory Problems     Diagnosis Date Noted   • Acquired hallux valgus of left foot 04/28/2023   • Adrenal gland cyst (Multi) 04/28/2023   • ANABEL positive 04/28/2023   • Elevated LDL cholesterol level 04/28/2023   • GERD (gastroesophageal reflux disease) 04/28/2023   • Herpes simplex type 2 infection 04/28/2023   • Hyperlipidemia 04/28/2023   • Menstrual migraine without status migrainosus, not intractable 04/28/2023   • Adrenal mass (Multi) 10/03/2023   • Contact dermatitis 10/03/2023   • Facial swelling  10/03/2023   • Fatigue 10/03/2023   • Hearing loss 10/03/2023   • Migraine 10/03/2023   • Unexplained night sweats 10/03/2023   • Anxiety 10/04/2023   • Sleep disturbance 10/04/2023   • Polyarthritis with positive rheumatoid factor (Multi) 05/08/2024     Resolved Ambulatory Problems     Diagnosis Date Noted   • No Resolved Ambulatory Problems     Past Medical History:   Diagnosis Date   • Adrenal adenoma    • Personal history of other diseases of the female genital tract        Family History   Problem Relation Name Age of Onset   • Osteoarthritis Mother     • Osteoporosis Mother     • Diabetes Father     • Heart attack Father     • Osteoporosis Other Grandmother        Past Surgical History:   Procedure Laterality Date   • BREAST SURGERY  01/18/2016    Breast Surgery Reduction Procedure   • TONSILLECTOMY  01/18/2016    Tonsillectomy       Social History     Tobacco Use   Smoking Status Never   Smokeless Tobacco Never       Allergies  Patient has no known allergies.    Current Meds  Current Outpatient Medications   Medication Instructions   • acyclovir (Zovirax) 400 mg tablet TAKE 1 TABLET BY MOUTH TWICE DAILY. BEGIN 3 DAYS BEFORE SURGERY AND. CONTINUE UNTIL GONE   • cephalexin (Keflex) 500 mg capsule TAKE 1 CAPSULE BY MOUTH THREE TIMES DAILY. BEGIN AFTER PROCEDURE   • dexAMETHasone (DECADRON) 1 mg, oral, Once, For dexamethasone tolerance test. Take at 11:00pm the night before lab draw. Be sure to have cortisol, DM, and ACTH labs drawn before 9:00am the following day.   • escitalopram (LEXAPRO) 10 mg, oral, Daily   • sulfacetamide sodium-sulfur (Avar, Plexion) 10-5 % (w/w) topical emulsion WASH AFFECTED AREAS DAILY   • SUMAtriptan (Imitrex) 50 mg tablet Take 1 tablet for migraine relief.  May repeat every 2 hours.  Max 200 mg a day   • traZODone (DESYREL) 50 mg, oral, Nightly PRN   • valACYclovir (VALTREX) 1,000 mg, oral, Every 12 hours        Lab Results   Component Value Date    RF 16 (H) 05/07/2024    SEDRATE 18  05/07/2024    URICACID 4.3 05/07/2024    DNADS <1.0 05/07/2024        Rad Moore MD

## 2024-07-03 ENCOUNTER — LAB (OUTPATIENT)
Dept: LAB | Facility: LAB | Age: 54
End: 2024-07-03
Payer: COMMERCIAL

## 2024-07-03 DIAGNOSIS — E27.8 ADRENAL GLAND CYST (MULTI): ICD-10-CM

## 2024-07-03 DIAGNOSIS — E27.8 ADRENAL MASS (MULTI): ICD-10-CM

## 2024-07-03 LAB — CORTIS AM PEAK SERPL-MSCNC: 9.3 UG/DL (ref 5–20)

## 2024-07-03 PROCEDURE — 82533 TOTAL CORTISOL: CPT

## 2024-07-03 PROCEDURE — 36415 COLL VENOUS BLD VENIPUNCTURE: CPT

## 2024-07-03 PROCEDURE — 82024 ASSAY OF ACTH: CPT

## 2024-07-03 PROCEDURE — 80375 DRUG/SUBSTANCE NOS 1-3: CPT

## 2024-07-06 LAB — ACTH PLAS-MCNC: <1.5 PG/ML (ref 7.2–63.3)

## 2024-07-08 LAB — DEXAMETHASONE SERPL-MCNC: 258 NG/DL

## 2024-08-20 ENCOUNTER — APPOINTMENT (OUTPATIENT)
Dept: ENDOCRINOLOGY | Facility: CLINIC | Age: 54
End: 2024-08-20
Payer: COMMERCIAL

## 2024-08-20 VITALS
DIASTOLIC BLOOD PRESSURE: 77 MMHG | SYSTOLIC BLOOD PRESSURE: 137 MMHG | HEART RATE: 100 BPM | WEIGHT: 135 LBS | HEIGHT: 65 IN | BODY MASS INDEX: 22.49 KG/M2

## 2024-08-20 DIAGNOSIS — E27.8 ADRENAL MASS (MULTI): Primary | ICD-10-CM

## 2024-08-20 PROCEDURE — 99205 OFFICE O/P NEW HI 60 MIN: CPT | Performed by: INTERNAL MEDICINE

## 2024-08-20 PROCEDURE — 3008F BODY MASS INDEX DOCD: CPT | Performed by: INTERNAL MEDICINE

## 2024-08-20 NOTE — PROGRESS NOTES
Reason for consult: B/l Adrenal masses     HPI:  Ms. Calhoun is a 54-year-old female with no significant past medical history apart from incidentally found bilateral adrenal masses who presents for evaluation at the request of her surgeon due to nonsuppressible cortisol level on dexamethasone suppression test.     Background history:   -Patient had a screening test with CT chest for cardiac scoring in 2021 that showed an incidental finding of bilateral adrenal masses 5.1 cm on the left and 2.9 cm on the right.     -She established care with Dr. Hastings in May 2021 and he has been following her with blood work and imaging since.    -CT scan imaging showed stable masses thus far.    -Hormonal panel in 5/2021:   Latest Reference Range & Units 05/06/21 11:47   Normetanephrine <0.90 nmol/L 0.96 (H)   Metanephrine <0.50 nmol/L 0.22   CORTISOL 2.5 - 20.0 ug/dL 11.9   Renin Activity ng/mL/hr 1.9   Aldosterone 0.0 - 30.0 ng/dL 2.5     -Follow-up blood work in 3/2022:   Latest Reference Range & Units 03/07/22 11:47   Normetanephrine <0.90 nmol/L 0.88   Metanephrine <0.50 nmol/L 0.22   CORTISOL 2.5 - 20.0 ug/dL 13.3   Renin Activity ng/mL/hr 2.6   Thyroid Stimulating Hormone 0.44 - 3.98 mIU/L 1.59   Vitamin D, 25-Hydroxy, Total ng/mL 50   Aldosterone ng/dL 4.0   GLUCOSE 74 - 99 mg/dL 90     -Blood work in 10/2023 showed a mildly elevated cortisol level, She did report that she was going through a divorce at that time:   Latest Reference Range & Units 10/20/23 10:16   GLUCOSE 74 - 99 mg/dL 92   Cortisol  A.M. 5.0 - 20.0 ug/dL 23.1 (H)   Adrenocorticotropic Hormone (ACTH) 7.2 - 63.3 pg/mL 4.0 (L)   (H): Data is abnormally high  (L): Data is abnormally low    -Patient was supposed to get a dexamethasone suppression test in May 2024 however it appears that the patient did not take dexamethasone prior to obtaining blood work:   Latest Reference Range & Units 05/07/24 11:22   CORTISOL 2.5 - 20.0 ug/dL 16.9   Adrenocorticotropic  Hormone (ACTH) 7.2 - 63.3 pg/mL 1.9 (L)   Dexamethasone ng/dL <50.0   (L): Data is abnormally low    -This was again repeated in July 2024:   Latest Reference Range & Units 07/03/24 07:59   Cortisol  A.M. 5.0 - 20.0 ug/dL 9.3   Adrenocorticotropic Hormone (ACTH) 7.2 - 63.3 pg/mL <1.5 (L)   Dexamethasone ng/dL 258.0   (L): Data is abnormally low    As shown above patient cortisol level did not suppress with a dexamethasone suppression test.  Therefore, she was referred to our clinic for evaluation.    Today: Patient states that she does not recall if she was particularly stressed/anxious when she had dexamethasone suppression test, she says on average she wakes up 1-2 times a night to urinate, she was emotional during the interview while talking about her divorce that was finalized a year ago but she believes that she is now doing well and is now mentally and emotionally stable and is no longer on antidepressants.  She denies significant alcohol use and reports that she drinks 1-2 drinks 1-2 times a week, she denies being on hormone replacement therapy.    She denies delayed puberty or precocious puberty, she reports that she had menarche at age 13-14, denies issues with hirsutism or lack body hair, she has 2 healthy biological children and had no issues conceiving.  Her last menstrual period was 2 years ago.    She has no history of hypertension, today blood pressure is  elevated at 137/77 but reports that she is typically normotensive and today she was particularly stressed when the nurse measured her blood pressure.  She has no history of diabetes.  Denies any history of falls or fractures.  Denies any easy bruising or stretch marks/striae.  Denies hirsutism.  Denies muscle weakness and reports that she actually does strength training 3 times a week.  Reports that she lost 32 pounds last year when she was going through divorce and was able to maintain the weight off.  Reports good energy level and states that her  "sleep is generally well apart from waking up 1-2 times to urinate as mentioned above.    Of note, patient mentions that her father was noted to have bilateral adrenal masses on one of his CT scan reports that she read, she states that this was not worked up and but she is unaware of further details.  Current Outpatient Medications   Medication Instructions    SUMAtriptan (Imitrex) 50 mg tablet Take 1 tablet for migraine relief.  May repeat every 2 hours.  Max 200 mg a day    valACYclovir (VALTREX) 1,000 mg, oral, Every 12 hours       Past Medical History  She has a past medical history of Adrenal adenoma and Personal history of other diseases of the female genital tract.    Surgical History  She has a past surgical history that includes Tonsillectomy (01/18/2016) and Breast surgery (01/18/2016).     Social History  She reports that she has never smoked. She has never been exposed to tobacco smoke. She has never used smokeless tobacco. No history on file for alcohol use and drug use.    Family History  Family History   Problem Relation Name Age of Onset    Osteoarthritis Mother      Osteoporosis Mother      Diabetes Father      Heart attack Father      Osteoporosis Other Grandmother            Objective   Last Recorded Vitals  Blood pressure 137/77, pulse 100, height 1.651 m (5' 5\"), weight 61.2 kg (135 lb).    Constitutional: Middle-aged  female, physically fit, NAD, well groomed. AOx3. Cooperative  Skin/Hair: Warm, dry skin, no striae, no acanthosis  HEENT: EOMI, Anicteric scleras, negative Chvostek sign   Neck: No dorsocervical fat pad or supraclavicular fat pad.Thyroid gland palpation: non nodular, soft consistency, non tender, no bruit.   Cardiovascular: Normal heart rate  Respiratory: No increased work of breathing  Abdomen: Soft, nontender to palpation.  Extremities: Preserved peripheral pulses, No peripheral edema.  Neuro: Moving all extremities spontaneously. CN's grossly intact. No balance or gait " "disturbances. DTRs: no delay in relaxation phase.      Dexamethasone suppression test:   Latest Reference Range & Units 07/03/24 07:59   Cortisol  A.M. 5.0 - 20.0 ug/dL 9.3   Adrenocorticotropic Hormone (ACTH) 7.2 - 63.3 pg/mL <1.5 (L)   Dexamethasone ng/dL 258.0   (L): Data is abnormally low    CT abdomen w/ contrast 10/2023:  IMPRESSION:  Stable multiple bilateral adrenal adenomas since 2021.  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     Assessment/Plan   54-year-old female with incidental imaging finding of bilateral \" adrenal masses\" discovered in 2021 that has since been stable on imaging.  She was referred to us due to nonsuppressible cortisol level on dexamethasone suppression test.    She was previously evaluated with plasma metanephrines, renin and aldosterone all of which were reassuring.  She had a mildly elevated cortisol level to 23 in October 2023 (at that time she was going through a stressful life event).  Low-dose dexamethasone suppression test done recently July 2024 was nonsuppressible (cortisol was 9.3).    Clinically patient has no signs or symptoms that are characteristics of hypercortisolism/Cushing's.  Nonsuppressed cortisol levels likely secondary to poor night sleep versus stress.     CT scan images were reviewed.  The described \"adrenal masses\" have some punctate calcifications within them; and likely represent adrenal hyperplasia which may be congenital (possibly patient had an enzyme deficiency (mild form) that the patient was able to compensate for with increase in size).  There is very little concern that the \"adrenal masses\" are functional.    Will obtain the following blood work:  -17 hydroxyprogesterone and 17 hydroxypregnenolone  -DHEA, DHEA-S, sex hormone binding globulin  -ACTH, cortisol    Will contact patient with results and determine the need for repeat suppression test vs obtaining 24 hour urine free " cortisol     Case seen, examined, and discussed with Dr. Lewis

## 2024-09-19 ENCOUNTER — LAB (OUTPATIENT)
Dept: LAB | Facility: LAB | Age: 54
End: 2024-09-19
Payer: COMMERCIAL

## 2024-09-19 DIAGNOSIS — E27.8 ADRENAL MASS (MULTI): ICD-10-CM

## 2024-09-19 PROCEDURE — 84143 ASSAY OF 17-HYDROXYPREGNENO: CPT

## 2024-09-19 PROCEDURE — 82670 ASSAY OF TOTAL ESTRADIOL: CPT

## 2024-09-19 PROCEDURE — 82627 DEHYDROEPIANDROSTERONE: CPT

## 2024-09-19 PROCEDURE — 83498 ASY HYDROXYPROGESTERONE 17-D: CPT

## 2024-09-19 PROCEDURE — 82533 TOTAL CORTISOL: CPT

## 2024-09-19 PROCEDURE — 82626 DEHYDROEPIANDROSTERONE: CPT

## 2024-09-19 PROCEDURE — 84270 ASSAY OF SEX HORMONE GLOBUL: CPT

## 2024-09-19 PROCEDURE — 36415 COLL VENOUS BLD VENIPUNCTURE: CPT

## 2024-09-19 PROCEDURE — 82024 ASSAY OF ACTH: CPT

## 2024-09-20 LAB
CORTIS AM PEAK SERPL-MSCNC: 14.7 UG/DL (ref 5–20)
DHEA-S SERPL-MCNC: 11 UG/DL (ref 30–260)
ESTRADIOL SERPL-MCNC: <19 PG/ML

## 2024-09-21 LAB
ACTH PLAS-MCNC: <1.5 PG/ML (ref 7.2–63.3)
SHBG SERPL-SCNC: 90 NMOL/L (ref 17–125)

## 2024-09-23 LAB — 17OH-PREG SERPL-MCNC: 39 NG/DL

## 2024-09-24 LAB
17OHP SERPL-MCNC: 46.91 NG/DL
DHEA SERPL-MCNC: 0.23 NG/ML (ref 0.63–4.7)

## 2024-10-07 ENCOUNTER — OFFICE VISIT (OUTPATIENT)
Dept: PRIMARY CARE | Facility: CLINIC | Age: 54
End: 2024-10-07
Payer: COMMERCIAL

## 2024-10-07 ENCOUNTER — LAB (OUTPATIENT)
Dept: LAB | Facility: LAB | Age: 54
End: 2024-10-07
Payer: COMMERCIAL

## 2024-10-07 VITALS
DIASTOLIC BLOOD PRESSURE: 76 MMHG | SYSTOLIC BLOOD PRESSURE: 126 MMHG | WEIGHT: 138 LBS | HEART RATE: 79 BPM | OXYGEN SATURATION: 99 % | BODY MASS INDEX: 22.96 KG/M2

## 2024-10-07 DIAGNOSIS — R73.03 PREDIABETES: ICD-10-CM

## 2024-10-07 DIAGNOSIS — E78.2 MIXED HYPERLIPIDEMIA: ICD-10-CM

## 2024-10-07 DIAGNOSIS — R53.83 OTHER FATIGUE: ICD-10-CM

## 2024-10-07 DIAGNOSIS — E27.8 ADRENAL MASS (MULTI): Primary | ICD-10-CM

## 2024-10-07 DIAGNOSIS — R42 LIGHTHEADEDNESS: ICD-10-CM

## 2024-10-07 DIAGNOSIS — J30.2 SEASONAL ALLERGIC RHINITIS, UNSPECIFIED TRIGGER: ICD-10-CM

## 2024-10-07 DIAGNOSIS — E27.8 ADRENAL MASS (MULTI): ICD-10-CM

## 2024-10-07 DIAGNOSIS — J30.2 SEASONAL ALLERGIES: ICD-10-CM

## 2024-10-07 LAB
ALBUMIN SERPL BCP-MCNC: 4.7 G/DL (ref 3.4–5)
ALP SERPL-CCNC: 73 U/L (ref 33–110)
ALT SERPL W P-5'-P-CCNC: 13 U/L (ref 7–45)
ANION GAP SERPL CALC-SCNC: 10 MMOL/L (ref 10–20)
AST SERPL W P-5'-P-CCNC: 18 U/L (ref 9–39)
BASOPHILS # BLD AUTO: 0.09 X10*3/UL (ref 0–0.1)
BASOPHILS NFR BLD AUTO: 1.1 %
BILIRUB SERPL-MCNC: 0.5 MG/DL (ref 0–1.2)
BUN SERPL-MCNC: 13 MG/DL (ref 6–23)
CALCIUM SERPL-MCNC: 10 MG/DL (ref 8.6–10.3)
CHLORIDE SERPL-SCNC: 103 MMOL/L (ref 98–107)
CHOLEST SERPL-MCNC: 228 MG/DL (ref 0–199)
CHOLESTEROL/HDL RATIO: 3.6
CO2 SERPL-SCNC: 29 MMOL/L (ref 21–32)
CREAT SERPL-MCNC: 0.8 MG/DL (ref 0.5–1.05)
EGFRCR SERPLBLD CKD-EPI 2021: 87 ML/MIN/1.73M*2
EOSINOPHIL # BLD AUTO: 0.74 X10*3/UL (ref 0–0.7)
EOSINOPHIL NFR BLD AUTO: 9.2 %
ERYTHROCYTE [DISTWIDTH] IN BLOOD BY AUTOMATED COUNT: 12.9 % (ref 11.5–14.5)
GLUCOSE SERPL-MCNC: 96 MG/DL (ref 74–99)
HCT VFR BLD AUTO: 47.6 % (ref 36–46)
HDLC SERPL-MCNC: 62.6 MG/DL
HGB BLD-MCNC: 15.4 G/DL (ref 12–16)
IMM GRANULOCYTES # BLD AUTO: 0.02 X10*3/UL (ref 0–0.7)
IMM GRANULOCYTES NFR BLD AUTO: 0.2 % (ref 0–0.9)
LDLC SERPL CALC-MCNC: 136 MG/DL
LYMPHOCYTES # BLD AUTO: 1.83 X10*3/UL (ref 1.2–4.8)
LYMPHOCYTES NFR BLD AUTO: 22.6 %
MCH RBC QN AUTO: 30.6 PG (ref 26–34)
MCHC RBC AUTO-ENTMCNC: 32.4 G/DL (ref 32–36)
MCV RBC AUTO: 95 FL (ref 80–100)
MONOCYTES # BLD AUTO: 0.34 X10*3/UL (ref 0.1–1)
MONOCYTES NFR BLD AUTO: 4.2 %
NEUTROPHILS # BLD AUTO: 5.06 X10*3/UL (ref 1.2–7.7)
NEUTROPHILS NFR BLD AUTO: 62.7 %
NON HDL CHOLESTEROL: 165 MG/DL (ref 0–149)
NRBC BLD-RTO: 0 /100 WBCS (ref 0–0)
PLATELET # BLD AUTO: 279 X10*3/UL (ref 150–450)
POTASSIUM SERPL-SCNC: 4.4 MMOL/L (ref 3.5–5.3)
PROT SERPL-MCNC: 7.5 G/DL (ref 6.4–8.2)
RBC # BLD AUTO: 5.03 X10*6/UL (ref 4–5.2)
SODIUM SERPL-SCNC: 138 MMOL/L (ref 136–145)
TRIGL SERPL-MCNC: 148 MG/DL (ref 0–149)
TSH SERPL-ACNC: 0.73 MIU/L (ref 0.44–3.98)
VLDL: 30 MG/DL (ref 0–40)
WBC # BLD AUTO: 8.1 X10*3/UL (ref 4.4–11.3)

## 2024-10-07 PROCEDURE — 85025 COMPLETE CBC W/AUTO DIFF WBC: CPT

## 2024-10-07 PROCEDURE — 83036 HEMOGLOBIN GLYCOSYLATED A1C: CPT

## 2024-10-07 PROCEDURE — 80061 LIPID PANEL: CPT

## 2024-10-07 PROCEDURE — 84443 ASSAY THYROID STIM HORMONE: CPT

## 2024-10-07 PROCEDURE — 80053 COMPREHEN METABOLIC PANEL: CPT

## 2024-10-07 PROCEDURE — 99214 OFFICE O/P EST MOD 30 MIN: CPT | Performed by: STUDENT IN AN ORGANIZED HEALTH CARE EDUCATION/TRAINING PROGRAM

## 2024-10-07 PROCEDURE — 36415 COLL VENOUS BLD VENIPUNCTURE: CPT

## 2024-10-07 PROCEDURE — 1036F TOBACCO NON-USER: CPT | Performed by: STUDENT IN AN ORGANIZED HEALTH CARE EDUCATION/TRAINING PROGRAM

## 2024-10-07 RX ORDER — AZELASTINE 1 MG/ML
1 SPRAY, METERED NASAL 2 TIMES DAILY
Qty: 30 ML | Refills: 2 | Status: SHIPPED | OUTPATIENT
Start: 2024-10-07

## 2024-10-07 RX ORDER — FLUTICASONE PROPIONATE 50 MCG
1 SPRAY, SUSPENSION (ML) NASAL 2 TIMES DAILY
Qty: 16 G | Refills: 1 | Status: SHIPPED | OUTPATIENT
Start: 2024-10-07

## 2024-10-07 NOTE — PROGRESS NOTES
Subjective   Patient ID: Yanci Calhoun is a 54 y.o. female who presents for Blood Pressure Concerns.    HPI   1.  Lightheadedness  Patient presented today in office with a 2-week history of lightheadedness and feeling tired.  Patient was worried about possibility of elevated blood pressure, which in fact has been at goal.  Denies any lightheadedness is associated with positional changing or orthostatic.  She is postmenopausal.  Denies any visual disturbance, tinnitus, dizziness or other neurological symptoms  Denies any fever chills or any other constitutional symptoms    2. Hyperlipidemia  Labs performed prior to today's visit on 02/12/24  Prior CT Cardiac score with result of 0.   (down from 177)   Cholesterol 217 (down from 261)  HDL 72, triglycerides 65   Believes high numbers were as a result of high stress, which has decreased since last visit      3. Prediabetes  Recent HbA1c 5.7%   Reports having family history of diabetes      4. Adrenal mass/ Unexplained night sweats/Fatigue  Follows with Dr. Hastings for bilateral adrenal adenomas.  Has recently done lab work with them and has yet to discuss the finding with endocrinology    5.  Allergic rhinitis  Patient has a long history of allergic rhinitis associated with frontal sinuses pressure.  He has been using for a long time over-the-counter Allegra-D, Claritin-D without much improvement.  Willing to try something more topical, to reduce systemic side effect      Review of Systems  All pertinent positive symptoms are included in the history of present illness.    All other systems have been reviewed and are negative and noncontributory to this patient's current ailments.    Objective   /76 (BP Location: Left arm, Patient Position: Sitting, BP Cuff Size: Adult)   Pulse 79   Wt 62.6 kg (138 lb)   SpO2 99%   BMI 22.96 kg/m²     Physical Exam  Constitutional:       General: She is not in acute distress.     Appearance: Normal appearance. She is  normal weight. She is not ill-appearing.   HENT:      Head: Normocephalic and atraumatic.      Right Ear: External ear normal.      Left Ear: External ear normal.      Nose: Nose normal. No congestion or rhinorrhea.      Mouth/Throat:      Mouth: Mucous membranes are moist.      Pharynx: Oropharynx is clear. No oropharyngeal exudate or posterior oropharyngeal erythema.   Eyes:      General: No scleral icterus.     Extraocular Movements: Extraocular movements intact.      Conjunctiva/sclera: Conjunctivae normal.      Pupils: Pupils are equal, round, and reactive to light.   Cardiovascular:      Rate and Rhythm: Normal rate and regular rhythm.      Pulses: Normal pulses.      Heart sounds: Normal heart sounds. No murmur heard.  Pulmonary:      Effort: Pulmonary effort is normal. No respiratory distress.      Breath sounds: Normal breath sounds. No wheezing, rhonchi or rales.   Abdominal:      General: Abdomen is flat. Bowel sounds are normal.      Palpations: Abdomen is soft.      Tenderness: There is no abdominal tenderness. There is no guarding or rebound.   Musculoskeletal:         General: No deformity. Normal range of motion.      Right lower leg: No edema.      Left lower leg: No edema.   Skin:     General: Skin is warm and dry.      Capillary Refill: Capillary refill takes less than 2 seconds.      Findings: No lesion or rash.   Neurological:      General: No focal deficit present.      Mental Status: She is alert and oriented to person, place, and time. Mental status is at baseline.   Psychiatric:         Mood and Affect: Mood normal.         Behavior: Behavior normal.         Assessment/Plan   Diagnoses and all orders for this visit:  Adrenal mass (Multi)  -     TSH with reflex to Free T4 if abnormal; Future  -     Lipid Panel; Future  -     Comprehensive Metabolic Panel; Future  -     CBC and Auto Differential; Future  -     Hemoglobin A1C; Future  Mixed hyperlipidemia  -     TSH with reflex to Free T4 if  abnormal; Future  -     Lipid Panel; Future  -     Comprehensive Metabolic Panel; Future  -     CBC and Auto Differential; Future  -     Hemoglobin A1C; Future  Prediabetes  -     TSH with reflex to Free T4 if abnormal; Future  -     Lipid Panel; Future  -     Comprehensive Metabolic Panel; Future  -     CBC and Auto Differential; Future  -     Hemoglobin A1C; Future  Lightheadedness/ Other fatigue  -     TSH with reflex to Free T4 if abnormal; Future  -     Lipid Panel; Future  -     Comprehensive Metabolic Panel; Future  -     CBC and Auto Differential; Future  -     Hemoglobin A1C; Future  At this time I believe symptoms of lightheadedness and fatigue are more consistent with postmenopausal symptoms, likely vasomotor related.  Provided lab work to rule out any other etiologies.  Patient was reassured and will monitor her closely    Seasonal allergies  Seasonal allergic rhinitis, unspecified trigger  Discussed in length about allergic rhinitis and treatment options.  Agreed to start a trial of Astelin and Flonase  Instruction for proper use of the nasal inhalers was given today.  Will monitor her symptoms    Thank you for letting us be a part of your care team.  Please call the office if you have further questions or concerns regarding your care    Jorge A Rain MD  [id number], FM Resident

## 2024-10-07 NOTE — PROGRESS NOTES
Lab results were reviewed. Indicating an adrenal source of cortisol production however patient has no signs or symptoms of hypercortisolism. Plan to collect 24 hour urine cortisol, creatinine with UA and to monitor clinically. [facility] message was sent to the patient.      Latest Reference Range & Units 09/19/24 13:06   DHEA 0.630 - 4.700 ng/mL 0.235 (L)   17-Hydroxyprogesterone <=206.00 ng/dL 46.91   DHEA Sulfate 30 - 260 ug/dL 11 (L)   Estradiol pg/mL <19   17-Hydroxypregnenolone <=226 ng/dL 39   Sex Hormone Binding Globulin 17 - 125 nmol/L 90   Cortisol  A.M. 5.0 - 20.0 ug/dL 14.7   Adrenocorticotropic Hormone (ACTH) 7.2 - 63.3 pg/mL <1.5 (L)   (L): Data is abnormally low

## 2024-10-08 LAB
EST. AVERAGE GLUCOSE BLD GHB EST-MCNC: 108 MG/DL
HBA1C MFR BLD: 5.4 %

## 2024-10-08 NOTE — RESULT ENCOUNTER NOTE
Cholesterol continues to be slightly elevated at 228, HDL 62, , triglycerides 148    Complete blood cell count shows no anemia    Thyroid within normal limits    Sugar, kidneys, liver, electrolytes are all within normal limits    Hemoglobin A1c is still pending at this time

## 2024-10-09 ENCOUNTER — HOSPITAL ENCOUNTER (OUTPATIENT)
Dept: RADIOLOGY | Facility: CLINIC | Age: 54
Discharge: HOME | End: 2024-10-09
Payer: COMMERCIAL

## 2024-10-09 VITALS — BODY MASS INDEX: 22.99 KG/M2 | HEIGHT: 65 IN | WEIGHT: 138 LBS

## 2024-10-09 DIAGNOSIS — Z12.31 SCREENING MAMMOGRAM FOR BREAST CANCER: ICD-10-CM

## 2024-10-09 DIAGNOSIS — R42 LIGHTHEADEDNESS: ICD-10-CM

## 2024-10-09 PROCEDURE — 77067 SCR MAMMO BI INCL CAD: CPT

## 2024-10-15 ENCOUNTER — HOSPITAL ENCOUNTER (OUTPATIENT)
Dept: RADIOLOGY | Facility: EXTERNAL LOCATION | Age: 54
Discharge: HOME | End: 2024-10-15

## 2024-10-22 DIAGNOSIS — B00.9 HERPES SIMPLEX TYPE 2 INFECTION: ICD-10-CM

## 2024-10-22 RX ORDER — VALACYCLOVIR HYDROCHLORIDE 1 G/1
1000 TABLET, FILM COATED ORAL DAILY
Qty: 30 TABLET | Refills: 0 | Status: SHIPPED | OUTPATIENT
Start: 2024-10-22

## 2024-12-10 DIAGNOSIS — H05.20 BULGING EYES: ICD-10-CM

## 2025-02-15 NOTE — PROGRESS NOTES
Subjective   Patient ID: Yanci Syed is a 55 y.o. female who presents for Annual Exam.  Today she is accompanied by alone.     HPI  Health Maintenance  Overall health is well.  Denies any chest pain, shortness of breath or wheezing upon exertion.  Denies any fever chills or constitutional symptoms, denies any unintentional weight loss.  Denies any nausea/vomiting or constipation/diarrhea.  Denies any urinary symptoms.  Denies any recent change in hearing or vision.  Denies any lightheadedness, dizziness or balance problem   Colonoscopy: performed 9/13/2019, 10 year clearance.  Mammogram: Last one on 10/2024, provided by Gynecologist CCLINO.  Pap Smear/HPV; Patient states are up-to-date and normal. Follows with [facility].  Shingles up-to-date 2022 x1 and refuses to get the second due to a reaction at this time;  Tdap 2019  COVID-19 x2  Diet: Recently started to pay more attention to diet, including more fruits and vegetables and staying hydrated  Exercise:  3-4 times a week, Cardio and weight lifting.  Alcohol: No alcohol  Tobacco: Denies  Recently did lab work at [facility] [employer] and asking to discuss those      2. Hyperlipidemia  Prior CT Cardiac score with result of 0.  Lab work from 10/2024     Cholesterol 228   HDL 62.6, triglycerides 148   Currently managed with lifestyle modification    3. Prediabetes  Recent HbA1c 5.4% on 10/2024  Reports having family history of diabetes      4. Adrenal mass/ Unexplained night sweats/Fatigue  Follows with Dr. Hastings for bilateral adrenal adenomas.     5.  Allergic rhinitis  Patient has a long history of allergic rhinitis associated with frontal sinuses pressure.  He has been using for a long time over-the-counter Allegra-D, Claritin-D without much improvement.  Willing to try something more topical, to reduce systemic side effect    6.  Cold sores  Has a history of recurrent cold sores and uses Valtrex  with flareups  Requesting refills.    7.  Migraine  He  "has a history of migraines which are becoming less frequent and less intensive with menopause.  Uses Imitrex as needed with good results  Asking for refills      Current Outpatient Medications on File Prior to Visit   Medication Sig Dispense Refill    azelastine (Astelin) 137 mcg (0.1 %) nasal spray Administer 1 spray into each nostril 2 times a day. 30 mL 2    fluticasone (Flonase) 50 mcg/actuation nasal spray Administer 1 spray into each nostril 2 times a day. 16 g 1    SUMAtriptan (Imitrex) 50 mg tablet Take 1 tablet for migraine relief.  May repeat every 2 hours.  Max 200 mg a day 9 tablet 3    valACYclovir (Valtrex) 1 gram tablet Take 1 tablet (1,000 mg) by mouth once daily. 30 tablet 0     No current facility-administered medications on file prior to visit.        No Known Allergies    Immunization History   Administered Date(s) Administered    COVID-19, mRNA, LNP-S, PF, 30 mcg/0.3 mL dose 05/24/2021, 06/14/2021    Hep A / Hep B 03/23/2016, 04/26/2016    Td vaccine, age 7 years and older (TENIVAC) 03/23/2016    Tdap vaccine, age 7 year and older (BOOSTRIX, ADACEL) 01/30/2014, 03/12/2019    Typhoid, Unspecified 03/23/2016    Zoster vaccine, recombinant, adult (SHINGRIX) 08/11/2022         Review of Systems  All pertinent positive symptoms are included in the history of present illness.  All other systems have been reviewed and are negative and noncontributory to this patient's current ailments.     Objective   /70 (BP Location: Left arm, Patient Position: Sitting, BP Cuff Size: Adult)   Pulse 105   Ht 1.651 m (5' 5\")   Wt 61.7 kg (136 lb)   SpO2 99%   BMI 22.63 kg/m²   BSA: 1.68 meters squared  No visits with results within 1 Month(s) from this visit.   Latest known visit with results is:   Lab on 10/07/2024   Component Date Value Ref Range Status    Thyroid Stimulating Hormone 10/07/2024 0.73  0.44 - 3.98 mIU/L Final    Cholesterol 10/07/2024 228 (H)  0 - 199 mg/dL Final          Age      Desirable  "  Borderline High   High     0-19 Y     0 - 169       170 - 199     >/= 200    20-24 Y     0 - 189       190 - 224     >/= 225         >24 Y     0 - 199       200 - 239     >/= 240   **All ranges are based on fasting samples. Specific   therapeutic targets will vary based on patient-specific   cardiac risk.    Pediatric guidelines reference:Pediatrics 2011, 128(S5).Adult guidelines reference: NCEP ATPIII Guidelines,GATITO 2001, 258:8936-86    Venipuncture immediately after or during the administration of Metamizole may lead to falsely low results. Testing should be performed immediately prior to Metamizole dosing.    HDL-Cholesterol 10/07/2024 62.6  mg/dL Final      Age       Very Low   Low     Normal    High    0-19 Y    < 35      < 40     40-45     ----  20-24 Y    ----     < 40      >45      ----        >24 Y      ----     < 40     40-60      >60      Cholesterol/HDL Ratio 10/07/2024 3.6   Final      Ref Values  Desirable  < 3.4  High Risk  > 5.0    LDL Calculated 10/07/2024 136 (H)  <=99 mg/dL Final                                Near   Borderline      AGE      Desirable  Optimal    High     High     Very High     0-19 Y     0 - 109     ---    110-129   >/= 130     ----    20-24 Y     0 - 119     ---    120-159   >/= 160     ----      >24 Y     0 -  99   100-129  130-159   160-189     >/=190      VLDL 10/07/2024 30  0 - 40 mg/dL Final    Triglycerides 10/07/2024 148  0 - 149 mg/dL Final       Age         Desirable   Borderline High   High     Very High   0 D-90 D    19 - 174         ----         ----        ----  91 D- 9 Y     0 -  74        75 -  99     >/= 100      ----    10-19 Y     0 -  89        90 - 129     >/= 130      ----    20-24 Y     0 - 114       115 - 149     >/= 150      ----         >24 Y     0 - 149       150 - 199    200- 499    >/= 500    Venipuncture immediately after or during the administration of Metamizole may lead to falsely low results. Testing should be performed immediately prior to  Metamizole dosing.    Non HDL Cholesterol 10/07/2024 165 (H)  0 - 149 mg/dL Final          Age       Desirable   Borderline High   High     Very High     0-19 Y     0 - 119       120 - 144     >/= 145    >/= 160    20-24 Y     0 - 149       150 - 189     >/= 190      ----         >24 Y    30 mg/dL above LDL Cholesterol goal      Glucose 10/07/2024 96  74 - 99 mg/dL Final    Sodium 10/07/2024 138  136 - 145 mmol/L Final    Potassium 10/07/2024 4.4  3.5 - 5.3 mmol/L Final    Chloride 10/07/2024 103  98 - 107 mmol/L Final    Bicarbonate 10/07/2024 29  21 - 32 mmol/L Final    Anion Gap 10/07/2024 10  10 - 20 mmol/L Final    Urea Nitrogen 10/07/2024 13  6 - 23 mg/dL Final    Creatinine 10/07/2024 0.80  0.50 - 1.05 mg/dL Final    eGFR 10/07/2024 87  >60 mL/min/1.73m*2 Final    Calculations of estimated GFR are performed using the 2021 CKD-EPI Study Refit equation without the race variable for the IDMS-Traceable creatinine methods.  https://jasn.asnjournals.org/content/early/2021/09/22/ASN.9970249420    Calcium 10/07/2024 10.0  8.6 - 10.3 mg/dL Final    Albumin 10/07/2024 4.7  3.4 - 5.0 g/dL Final    Alkaline Phosphatase 10/07/2024 73  33 - 110 U/L Final    Total Protein 10/07/2024 7.5  6.4 - 8.2 g/dL Final    AST 10/07/2024 18  9 - 39 U/L Final    Bilirubin, Total 10/07/2024 0.5  0.0 - 1.2 mg/dL Final    ALT 10/07/2024 13  7 - 45 U/L Final    Patients treated with Sulfasalazine may generate falsely decreased results for ALT.    WBC 10/07/2024 8.1  4.4 - 11.3 x10*3/uL Final    nRBC 10/07/2024 0.0  0.0 - 0.0 /100 WBCs Final    RBC 10/07/2024 5.03  4.00 - 5.20 x10*6/uL Final    Hemoglobin 10/07/2024 15.4  12.0 - 16.0 g/dL Final    Hematocrit 10/07/2024 47.6 (H)  36.0 - 46.0 % Final    MCV 10/07/2024 95  80 - 100 fL Final    MCH 10/07/2024 30.6  26.0 - 34.0 pg Final    MCHC 10/07/2024 32.4  32.0 - 36.0 g/dL Final    RDW 10/07/2024 12.9  11.5 - 14.5 % Final    Platelets 10/07/2024 279  150 - 450 x10*3/uL Final    Neutrophils %  10/07/2024 62.7  40.0 - 80.0 % Final    Immature Granulocytes %, Automated 10/07/2024 0.2  0.0 - 0.9 % Final    Immature Granulocyte Count (IG) includes promyelocytes, myelocytes and metamyelocytes but does not include bands. Percent differential counts (%) should be interpreted in the context of the absolute cell counts (cells/UL).    Lymphocytes % 10/07/2024 22.6  13.0 - 44.0 % Final    Monocytes % 10/07/2024 4.2  2.0 - 10.0 % Final    Eosinophils % 10/07/2024 9.2  0.0 - 6.0 % Final    Basophils % 10/07/2024 1.1  0.0 - 2.0 % Final    Neutrophils Absolute 10/07/2024 5.06  1.20 - 7.70 x10*3/uL Final    Percent differential counts (%) should be interpreted in the context of the absolute cell counts (cells/uL).    Immature Granulocytes Absolute, Au* 10/07/2024 0.02  0.00 - 0.70 x10*3/uL Final    Lymphocytes Absolute 10/07/2024 1.83  1.20 - 4.80 x10*3/uL Final    Monocytes Absolute 10/07/2024 0.34  0.10 - 1.00 x10*3/uL Final    Eosinophils Absolute 10/07/2024 0.74 (H)  0.00 - 0.70 x10*3/uL Final    Basophils Absolute 10/07/2024 0.09  0.00 - 0.10 x10*3/uL Final    Hemoglobin A1C 10/07/2024 5.4  See comment % Final    Estimated Average Glucose 10/07/2024 108  Not Established mg/dL Final       Physical Exam  Constitutional:       General: She is not in acute distress.     Appearance: Normal appearance. She is normal weight. She is not ill-appearing.   HENT:      Head: Normocephalic and atraumatic.      Right Ear: External ear normal.      Left Ear: External ear normal.      Nose: Nose normal. No congestion or rhinorrhea.      Mouth/Throat:      Mouth: Mucous membranes are moist.      Pharynx: Oropharynx is clear. No oropharyngeal exudate or posterior oropharyngeal erythema.   Eyes:      General: No scleral icterus.     Extraocular Movements: Extraocular movements intact.      Conjunctiva/sclera: Conjunctivae normal.      Pupils: Pupils are equal, round, and reactive to light.   Cardiovascular:      Rate and Rhythm: Normal  rate and regular rhythm.      Pulses: Normal pulses.      Heart sounds: Normal heart sounds. No murmur heard.  Pulmonary:      Effort: Pulmonary effort is normal. No respiratory distress.      Breath sounds: Normal breath sounds. No wheezing, rhonchi or rales.   Abdominal:      General: Abdomen is flat. Bowel sounds are normal.      Palpations: Abdomen is soft.      Tenderness: There is no abdominal tenderness. There is no guarding or rebound.   Musculoskeletal:         General: No deformity. Normal range of motion.      Right lower leg: No edema.      Left lower leg: No edema.   Skin:     General: Skin is warm and dry.      Capillary Refill: Capillary refill takes less than 2 seconds.      Findings: No lesion or rash.   Neurological:      General: No focal deficit present.      Mental Status: She is alert and oriented to person, place, and time. Mental status is at baseline.   Psychiatric:         Mood and Affect: Mood normal.         Behavior: Behavior normal.         Assessment/Plan   Health Maintenance  Complete history and physical exam performed  Colon cancer screening due 2029  Recently did lab work at [facility], which were reviewed and discussed in details with patient  No immunizations at today's visit   Continue following up with your OB/GYN for your well woman visits  Educated about the importance of conducting a healthy lifestyle, following a well balanced diet and exercising regularly    2. Hyperlipidemia  The 10-year ASCVD risk score (João JACKSON, et al., 2019) is: 2.1%    Values used to calculate the score:      Age: 55 years      Sex: Female      Is Non- : No      Diabetic: No      Tobacco smoker: No      Systolic Blood Pressure: 124 mmHg      Is BP treated: No      HDL Cholesterol: 62.6 mg/dL      Total Cholesterol: 228 mg/dL   Discussed about ASCVD score which is low at this moment.  Encouraged to continue lifestyle modification including well-balanced diet and  exercising regularly  Patient does not meet criteria for statin treatment at this moment  Will continue to monitor lipid panel periodically       3. Prediabetes  Stable  We will continue to monitor, encourage lifestyle modification     4. Adrenal mass/ Unexplained night sweats/Fatigue  Recommend continue following with your endocrinology, per their recommendations.    5.  Allergic rhinitis.  Well-controlled on current regimen  Refills of Astelin and Flonase were sent to local pharmacy    6.  Cold sores  Refills of Valtrex was sent to local pharmacy    7.  Migraines  Improving  Continue using Imitrex as needed, there is no contraindication for triptans at this moment  Refill sent to local pharmacy.    Thank you for letting us be a part of your care team.  Please call the office if you have further questions or concerns regarding your care    Otherwise, please follow-up in 6-12 months for continued care and refills as well as your yearly physical examination    Jorge A Rain MD  PGY2, FM Resident

## 2025-02-19 ENCOUNTER — APPOINTMENT (OUTPATIENT)
Dept: PRIMARY CARE | Facility: CLINIC | Age: 55
End: 2025-02-19
Payer: COMMERCIAL

## 2025-02-19 VITALS
SYSTOLIC BLOOD PRESSURE: 124 MMHG | HEART RATE: 105 BPM | OXYGEN SATURATION: 99 % | WEIGHT: 136 LBS | DIASTOLIC BLOOD PRESSURE: 70 MMHG | HEIGHT: 65 IN | BODY MASS INDEX: 22.66 KG/M2

## 2025-02-19 DIAGNOSIS — Z00.00 HEALTHCARE MAINTENANCE: Primary | ICD-10-CM

## 2025-02-19 DIAGNOSIS — J30.2 SEASONAL ALLERGIES: ICD-10-CM

## 2025-02-19 DIAGNOSIS — J30.2 SEASONAL ALLERGIC RHINITIS, UNSPECIFIED TRIGGER: ICD-10-CM

## 2025-02-19 DIAGNOSIS — R73.03 PREDIABETES: ICD-10-CM

## 2025-02-19 DIAGNOSIS — G43.829 MENSTRUAL MIGRAINE WITHOUT STATUS MIGRAINOSUS, NOT INTRACTABLE: ICD-10-CM

## 2025-02-19 DIAGNOSIS — R82.90 ABNORMAL URINALYSIS: ICD-10-CM

## 2025-02-19 DIAGNOSIS — E78.2 MIXED HYPERLIPIDEMIA: ICD-10-CM

## 2025-02-19 DIAGNOSIS — B00.9 HERPES SIMPLEX TYPE 2 INFECTION: ICD-10-CM

## 2025-02-19 PROCEDURE — 99396 PREV VISIT EST AGE 40-64: CPT | Performed by: STUDENT IN AN ORGANIZED HEALTH CARE EDUCATION/TRAINING PROGRAM

## 2025-02-19 PROCEDURE — 1036F TOBACCO NON-USER: CPT | Performed by: STUDENT IN AN ORGANIZED HEALTH CARE EDUCATION/TRAINING PROGRAM

## 2025-02-19 PROCEDURE — 99214 OFFICE O/P EST MOD 30 MIN: CPT | Performed by: STUDENT IN AN ORGANIZED HEALTH CARE EDUCATION/TRAINING PROGRAM

## 2025-02-19 PROCEDURE — 3008F BODY MASS INDEX DOCD: CPT | Performed by: STUDENT IN AN ORGANIZED HEALTH CARE EDUCATION/TRAINING PROGRAM

## 2025-02-19 RX ORDER — SUMATRIPTAN SUCCINATE 50 MG/1
TABLET ORAL
Qty: 9 TABLET | Refills: 3 | Status: SHIPPED | OUTPATIENT
Start: 2025-02-19

## 2025-02-19 RX ORDER — FLUTICASONE PROPIONATE 50 MCG
1 SPRAY, SUSPENSION (ML) NASAL 2 TIMES DAILY
Qty: 16 G | Refills: 1 | Status: SHIPPED | OUTPATIENT
Start: 2025-02-19

## 2025-02-19 RX ORDER — AZELASTINE 1 MG/ML
1 SPRAY, METERED NASAL 2 TIMES DAILY
Qty: 30 ML | Refills: 2 | Status: SHIPPED | OUTPATIENT
Start: 2025-02-19

## 2025-02-19 RX ORDER — VALACYCLOVIR HYDROCHLORIDE 1 G/1
1000 TABLET, FILM COATED ORAL DAILY
Qty: 30 TABLET | Refills: 0 | Status: SHIPPED | OUTPATIENT
Start: 2025-02-19

## 2025-02-19 ASSESSMENT — PATIENT HEALTH QUESTIONNAIRE - PHQ9
2. FEELING DOWN, DEPRESSED OR HOPELESS: NOT AT ALL
SUM OF ALL RESPONSES TO PHQ9 QUESTIONS 1 AND 2: 0
1. LITTLE INTEREST OR PLEASURE IN DOING THINGS: NOT AT ALL

## 2025-02-20 NOTE — RESULT ENCOUNTER NOTE
Urinalysis does show positivity leukocyte esterase, white blood cells and squamous epithelial cells  This does point towards a possible UTI    If the patient has any symptoms, it may be wise that we do start her on a prophylactic antibiotic    Please advise as we are now waiting for the culture

## 2025-02-21 LAB
APPEARANCE UR: CLEAR
BACTERIA #/AREA URNS HPF: ABNORMAL /HPF
BACTERIA UR CULT: NORMAL
BILIRUB UR QL STRIP: NEGATIVE
COLOR UR: YELLOW
GLUCOSE UR QL STRIP: NEGATIVE
HGB UR QL STRIP: NEGATIVE
HYALINE CASTS #/AREA URNS LPF: ABNORMAL /LPF
KETONES UR QL STRIP: NEGATIVE
LEUKOCYTE ESTERASE UR QL STRIP: ABNORMAL
NITRITE UR QL STRIP: NEGATIVE
PH UR STRIP: 6 [PH] (ref 5–8)
PROT UR QL STRIP: NEGATIVE
RBC #/AREA URNS HPF: ABNORMAL /HPF
SERVICE CMNT-IMP: ABNORMAL
SP GR UR STRIP: 1.02 (ref 1–1.03)
SQUAMOUS #/AREA URNS HPF: ABNORMAL /HPF
WBC #/AREA URNS HPF: ABNORMAL /HPF

## 2025-02-21 NOTE — RESULT ENCOUNTER NOTE
Urine culture showed no growth so nothing needs to be done other than if the patient has signs/symptoms of a UTI